# Patient Record
Sex: FEMALE | Race: OTHER | HISPANIC OR LATINO
[De-identification: names, ages, dates, MRNs, and addresses within clinical notes are randomized per-mention and may not be internally consistent; named-entity substitution may affect disease eponyms.]

---

## 2018-11-08 ENCOUNTER — APPOINTMENT (OUTPATIENT)
Dept: INTERNAL MEDICINE | Facility: CLINIC | Age: 28
End: 2018-11-08
Payer: COMMERCIAL

## 2018-11-08 ENCOUNTER — LABORATORY RESULT (OUTPATIENT)
Age: 28
End: 2018-11-08

## 2018-11-08 VITALS
OXYGEN SATURATION: 98 % | BODY MASS INDEX: 27.16 KG/M2 | HEIGHT: 66 IN | HEART RATE: 66 BPM | TEMPERATURE: 98.5 F | WEIGHT: 169 LBS | DIASTOLIC BLOOD PRESSURE: 66 MMHG | SYSTOLIC BLOOD PRESSURE: 102 MMHG

## 2018-11-08 DIAGNOSIS — K58.9 IRRITABLE BOWEL SYNDROME W/OUT DIARRHEA: ICD-10-CM

## 2018-11-08 PROCEDURE — 99385 PREV VISIT NEW AGE 18-39: CPT

## 2018-11-08 NOTE — DISCUSSION/SUMMARY
[Normal Weight (BMI <25)] : normal weight - BMI <25 [Non - Smoker] : non-smoker [FreeTextEntry1] : \par 28 year old woman presents for CPE.\par \par 1. HCM: Vaccines - Tdap up to date; offered Gardasil but pt prefers to read up on this some more before deciding; due for PAP testing - pt prefers female GYN - will recommend Dr. Walker; offered HIV screening but pt declined; counselled pt on safe sex practices, condom use, etc

## 2018-11-08 NOTE — REVIEW OF SYSTEMS
[Fever] : no fever [Chills] : no chills [Abdominal Pain] : abdominal pain [Vomiting] : no vomiting [Constipation] : constipation [Diarrhea] : diarrhea [Heartburn] : heartburn [Melena] : no melena [see HPI] : see HPI [Sleep Disturbances] : sleep disturbances [Anxiety] : anxiety [Emotional Problems] : emotional problems [Negative] : Heme/Lymph

## 2018-11-08 NOTE — HISTORY OF PRESENT ILLNESS
[Health Maintenance] : health maintenance [___ Year(s) Ago] : [unfilled] year(s) ago [Unmarried] : unmarried [Working Full Time] : working full time [Never Smoked Cigarettes] : has never smoked cigarettes [Occasional Use] : occasional alcohol use [None] : The patient has no concerns about alcohol abuse [Never] : has never used illicit drugs [Good] : good [Reg. Dental Visits] : She has regular dental visits [Vision Problems] : She denies vision problems [Hearing Loss] : She denies hearing loss [Healthy Diet] : She consumes a diverse and healthy diet [Weight Concerns] : She does not have any weight concerns [Regular Exercise] : She exercises regularly [Tobacco Use] : She does not use tobacco [Alcohol Use] : She denies alcohol use [Drug Abuse] : She uses illicit drugs [Sexually Active] : she is sexually active [Monogamous (Male Partner)] : is monogamous with a male partner [Reviewed and Updated] : risk screening reviewed and updated

## 2018-11-08 NOTE — PHYSICAL EXAM
[General Appearance - Alert] : alert [General Appearance - In No Acute Distress] : in no acute distress [Sclera] : the sclera and conjunctiva were normal [PERRL With Normal Accommodation] : pupils were equal in size, round, and reactive to light [Extraocular Movements] : extraocular movements were intact [Outer Ear] : the ears and nose were normal in appearance [Oropharynx] : the oropharynx was normal [Neck Appearance] : the appearance of the neck was normal [Neck Cervical Mass (___cm)] : no neck mass was observed [Jugular Venous Distention Increased] : there was no jugular-venous distention [Thyroid Diffuse Enlargement] : the thyroid was not enlarged [Thyroid Nodule] : there were no palpable thyroid nodules [Auscultation Breath Sounds / Voice Sounds] : lungs were clear to auscultation bilaterally [Heart Rate And Rhythm] : heart rate was normal and rhythm regular [Heart Sounds] : normal S1 and S2 [Heart Sounds Gallop] : no gallops [Murmurs] : no murmurs [Heart Sounds Pericardial Friction Rub] : no pericardial rub [Full Pulse] : the pedal pulses are present [Edema] : there was no peripheral edema [FreeTextEntry1] : patient declined, prefers female GYN [Bowel Sounds] : normal bowel sounds [Abdomen Soft] : soft [Abdomen Tenderness] : non-tender [Abdomen Mass (___ Cm)] : no abdominal mass palpated [Cervical Lymph Nodes Enlarged Posterior Bilaterally] : posterior cervical [Cervical Lymph Nodes Enlarged Anterior Bilaterally] : anterior cervical [Supraclavicular Lymph Nodes Enlarged Bilaterally] : supraclavicular [No CVA Tenderness] : no ~M costovertebral angle tenderness [No Spinal Tenderness] : no spinal tenderness [Abnormal Walk] : normal gait [Nail Clubbing] : no clubbing  or cyanosis of the fingernails [Involuntary Movements] : no involuntary movements were seen [Musculoskeletal - Swelling] : no joint swelling seen [Motor Tone] : muscle strength and tone were normal [] : no rash [Sensation] : the sensory exam was normal to light touch and pinprick [Motor Exam] : the motor exam was normal [No Focal Deficits] : no focal deficits [Oriented To Time, Place, And Person] : oriented to person, place, and time [Impaired Insight] : insight and judgment were intact [Affect] : the affect was normal

## 2018-11-09 LAB
ALBUMIN SERPL ELPH-MCNC: 4.7 G/DL
ALP BLD-CCNC: 37 U/L
ALT SERPL-CCNC: 9 U/L
ANION GAP SERPL CALC-SCNC: 14 MMOL/L
AST SERPL-CCNC: 14 U/L
BASOPHILS # BLD AUTO: 0.01 K/UL
BASOPHILS NFR BLD AUTO: 0.2 %
BILIRUB SERPL-MCNC: 0.5 MG/DL
BUN SERPL-MCNC: 9 MG/DL
CALCIUM SERPL-MCNC: 9.2 MG/DL
CHLORIDE SERPL-SCNC: 102 MMOL/L
CHOLEST SERPL-MCNC: 145 MG/DL
CHOLEST/HDLC SERPL: 2.1 RATIO
CO2 SERPL-SCNC: 23 MMOL/L
CREAT SERPL-MCNC: 0.69 MG/DL
EOSINOPHIL # BLD AUTO: 0.11 K/UL
EOSINOPHIL NFR BLD AUTO: 2.3 %
FERRITIN SERPL-MCNC: 15 NG/ML
GLUCOSE SERPL-MCNC: 86 MG/DL
HBA1C MFR BLD HPLC: 4.9 %
HCT VFR BLD CALC: 38.7 %
HDLC SERPL-MCNC: 68 MG/DL
HGB BLD-MCNC: 11.8 G/DL
HIV1+2 AB SPEC QL IA.RAPID: NONREACTIVE
IMM GRANULOCYTES NFR BLD AUTO: 0.2 %
IRON SATN MFR SERPL: 14 %
IRON SERPL-MCNC: 55 UG/DL
LDLC SERPL CALC-MCNC: 68 MG/DL
LYMPHOCYTES # BLD AUTO: 1.76 K/UL
LYMPHOCYTES NFR BLD AUTO: 36.7 %
MAN DIFF?: NORMAL
MCHC RBC-ENTMCNC: 23 PG
MCHC RBC-ENTMCNC: 30.5 GM/DL
MCV RBC AUTO: 75.6 FL
MONOCYTES # BLD AUTO: 0.48 K/UL
MONOCYTES NFR BLD AUTO: 10 %
NEUTROPHILS # BLD AUTO: 2.43 K/UL
NEUTROPHILS NFR BLD AUTO: 50.6 %
PLATELET # BLD AUTO: 213 K/UL
POTASSIUM SERPL-SCNC: 4 MMOL/L
PROT SERPL-MCNC: 7.4 G/DL
RBC # BLD: 5.12 M/UL
RBC # FLD: 22.7 %
SODIUM SERPL-SCNC: 139 MMOL/L
TIBC SERPL-MCNC: 395 UG/DL
TRIGL SERPL-MCNC: 47 MG/DL
TSH SERPL-ACNC: 2.73 UIU/ML
UIBC SERPL-MCNC: 340 UG/DL
WBC # FLD AUTO: 4.8 K/UL

## 2019-03-05 ENCOUNTER — APPOINTMENT (OUTPATIENT)
Dept: INTERNAL MEDICINE | Facility: CLINIC | Age: 29
End: 2019-03-05
Payer: COMMERCIAL

## 2019-03-05 VITALS
SYSTOLIC BLOOD PRESSURE: 142 MMHG | WEIGHT: 169 LBS | HEART RATE: 80 BPM | BODY MASS INDEX: 27.16 KG/M2 | OXYGEN SATURATION: 97 % | TEMPERATURE: 98.4 F | DIASTOLIC BLOOD PRESSURE: 78 MMHG | HEIGHT: 66 IN

## 2019-03-05 LAB
BILIRUB UR QL STRIP: NORMAL
CLARITY UR: CLEAR
COLLECTION METHOD: NORMAL
GLUCOSE UR-MCNC: NORMAL
HCG UR QL: 0.2 EU/DL
HGB UR QL STRIP.AUTO: NORMAL
KETONES UR-MCNC: NORMAL
LEUKOCYTE ESTERASE UR QL STRIP: NORMAL
NITRITE UR QL STRIP: POSITIVE
PH UR STRIP: 7
PROT UR STRIP-MCNC: 30
SP GR UR STRIP: 1.02

## 2019-03-05 PROCEDURE — 99214 OFFICE O/P EST MOD 30 MIN: CPT | Mod: 25

## 2019-03-05 PROCEDURE — 81003 URINALYSIS AUTO W/O SCOPE: CPT | Mod: QW

## 2019-03-05 NOTE — PHYSICAL EXAM
[No Acute Distress] : no acute distress [Well Nourished] : well nourished [Well Developed] : well developed [Well-Appearing] : well-appearing [No Respiratory Distress] : no respiratory distress  [Clear to Auscultation] : lungs were clear to auscultation bilaterally [No Accessory Muscle Use] : no accessory muscle use [Normal Rate] : normal rate  [Regular Rhythm] : with a regular rhythm [Normal S1, S2] : normal S1 and S2 [Soft] : abdomen soft [Non Tender] : non-tender [Non-distended] : non-distended [No HSM] : no HSM [Normal Bowel Sounds] : normal bowel sounds [No CVA Tenderness] : no CVA  tenderness [No Spinal Tenderness] : no spinal tenderness

## 2019-03-05 NOTE — HISTORY OF PRESENT ILLNESS
[FreeTextEntry8] : \par \par CC: Hematuria for the past few days\par \par HPI: Patient is a healthy 28-year-old female with no segment past medical history who presents with several day history of episodic hematuria no flank pain no fever, no chills no dysuria no frequency patient is sexually active with one partner she denies vaginal discharge abdominal pain nausea vomiting constipation diarrhea.  Patient has been on amoxicillin and penicillin for recent dental infection

## 2019-03-05 NOTE — ASSESSMENT
[FreeTextEntry1] : Patient is a healthy 28-year-old female who presents with acute cystitis rule out STD\par \par 1.  Acute cystitis\par Sexually active one partner  has some thick and vaginal discharge\par Urine dipstick positive for moderate leukocytes nitrate positive\par Start Bactrim 1 tablet twice daily first 3 days\par Send urine culture\par Send urine for GC chlamydia\par Observe\par \par

## 2019-03-05 NOTE — REVIEW OF SYSTEMS
[Hematuria] : hematuria [Negative] : Gastrointestinal [Dysuria] : no dysuria [Incontinence] : no incontinence [Nocturia] : no nocturia [Poor Libido] : libido not poor [Frequency] : no frequency [Vaginal Discharge] : no vaginal discharge [Dysmenorrhea] : no dysmenorrhea

## 2019-03-08 LAB
BACTERIA UR CULT: ABNORMAL
C TRACH RRNA SPEC QL NAA+PROBE: NOT DETECTED
N GONORRHOEA RRNA SPEC QL NAA+PROBE: NOT DETECTED
SOURCE AMPLIFICATION: NORMAL

## 2019-03-25 ENCOUNTER — APPOINTMENT (OUTPATIENT)
Dept: DERMATOLOGY | Facility: CLINIC | Age: 29
End: 2019-03-25
Payer: COMMERCIAL

## 2019-03-25 VITALS
DIASTOLIC BLOOD PRESSURE: 60 MMHG | BODY MASS INDEX: 25.11 KG/M2 | HEIGHT: 67 IN | SYSTOLIC BLOOD PRESSURE: 100 MMHG | WEIGHT: 160 LBS

## 2019-03-25 DIAGNOSIS — Z84.0 FAMILY HISTORY OF DISEASES OF THE SKIN AND SUBCUTANEOUS TISSUE: ICD-10-CM

## 2019-03-25 DIAGNOSIS — Z91.89 OTHER SPECIFIED PERSONAL RISK FACTORS, NOT ELSEWHERE CLASSIFIED: ICD-10-CM

## 2019-03-25 PROCEDURE — 99202 OFFICE O/P NEW SF 15 MIN: CPT

## 2019-04-18 ENCOUNTER — OUTPATIENT (OUTPATIENT)
Dept: OUTPATIENT SERVICES | Facility: HOSPITAL | Age: 29
LOS: 1 days | End: 2019-04-18
Payer: COMMERCIAL

## 2019-04-18 DIAGNOSIS — A15.0 TUBERCULOSIS OF LUNG: ICD-10-CM

## 2019-04-18 PROCEDURE — 71046 X-RAY EXAM CHEST 2 VIEWS: CPT | Mod: 26

## 2019-04-18 PROCEDURE — 71046 X-RAY EXAM CHEST 2 VIEWS: CPT

## 2019-10-29 ENCOUNTER — APPOINTMENT (OUTPATIENT)
Dept: INTERNAL MEDICINE | Facility: CLINIC | Age: 29
End: 2019-10-29
Payer: COMMERCIAL

## 2019-10-29 VITALS
HEART RATE: 83 BPM | BODY MASS INDEX: 26.37 KG/M2 | DIASTOLIC BLOOD PRESSURE: 80 MMHG | OXYGEN SATURATION: 98 % | WEIGHT: 168 LBS | HEIGHT: 67 IN | SYSTOLIC BLOOD PRESSURE: 110 MMHG

## 2019-10-29 LAB
HCT VFR BLD CALC: 41.9 %
HGB BLD-MCNC: 13.4 G/DL

## 2019-10-29 PROCEDURE — 99214 OFFICE O/P EST MOD 30 MIN: CPT | Mod: 25

## 2019-10-29 PROCEDURE — 36415 COLL VENOUS BLD VENIPUNCTURE: CPT

## 2019-10-29 NOTE — HISTORY OF PRESENT ILLNESS
[FreeTextEntry8] : CC: bright red blood per rectum for the past two days\par \par HPI: patient is a healthy 29-year-old female who presents with two day history of bright red blood per rectum on toilet paper denies lumps and bumps abdominal pain nausea vomiting constipation or diarrhea patient notes slight burning when passing stool. Denies family history of colitis, cancer: or bowel problems.

## 2019-10-29 NOTE — PHYSICAL EXAM
[Normal] : no joint swelling and grossly normal strength and tone [FreeTextEntry1] : Rectal even with escort

## 2019-10-29 NOTE — ASSESSMENT
[FreeTextEntry1] : Patient is a healthy 29-year-old female who presents with two day history of bright red per rectum\par \par 1. Bright red blood per rectum\par patient refused physical exam will refer to female colorectal surgeon to follow-up\par avoid constipation with Colace/fiber\par avoid spicy foods\par check CBC\par follow-up with PMD

## 2019-11-12 ENCOUNTER — APPOINTMENT (OUTPATIENT)
Dept: DERMATOLOGY | Facility: CLINIC | Age: 29
End: 2019-11-12
Payer: COMMERCIAL

## 2019-11-12 DIAGNOSIS — L30.0 NUMMULAR DERMATITIS: ICD-10-CM

## 2019-11-12 DIAGNOSIS — L30.1 DYSHIDROSIS [POMPHOLYX]: ICD-10-CM

## 2019-11-12 PROCEDURE — 99213 OFFICE O/P EST LOW 20 MIN: CPT

## 2019-12-11 ENCOUNTER — APPOINTMENT (OUTPATIENT)
Dept: OBGYN | Facility: CLINIC | Age: 29
End: 2019-12-11
Payer: COMMERCIAL

## 2019-12-11 VITALS
WEIGHT: 179 LBS | DIASTOLIC BLOOD PRESSURE: 70 MMHG | HEIGHT: 67 IN | BODY MASS INDEX: 28.09 KG/M2 | SYSTOLIC BLOOD PRESSURE: 114 MMHG

## 2019-12-11 PROCEDURE — 99385 PREV VISIT NEW AGE 18-39: CPT

## 2019-12-11 RX ORDER — SULFAMETHOXAZOLE AND TRIMETHOPRIM 800; 160 MG/1; MG/1
800-160 TABLET ORAL
Qty: 6 | Refills: 0 | Status: DISCONTINUED | COMMUNITY
Start: 2019-03-05 | End: 2019-12-11

## 2019-12-11 RX ORDER — HYDROCORTISONE ACETATE 30 MG/1
30 SUPPOSITORY RECTAL
Qty: 7 | Refills: 0 | Status: DISCONTINUED | COMMUNITY
Start: 2019-10-29 | End: 2019-12-11

## 2019-12-11 NOTE — HISTORY OF PRESENT ILLNESS
[Last Pap ___] : Last cervical pap smear was [unfilled] [HPV Vaccine ___] : HPV vaccine [unfilled] [Frequency: Q ___ days] : menstrual periods occur approximately every [unfilled] days [Menstrual Cramps] : menstrual cramps [Monogamous] : is monogamous [Sexually Active] : is sexually active [Contraception] : uses contraception [Condoms] : uses condoms [Male ___] : [unfilled] male [Regular Cycle Intervals] : periods have been irregular

## 2019-12-11 NOTE — PHYSICAL EXAM
[Awake] : awake [Alert] : alert [Acute Distress] : no acute distress [LAD] : no lymphadenopathy [Thyroid Nodule] : no thyroid nodule [Goiter] : no goiter [Mass] : no breast mass [Nipple Discharge] : no nipple discharge [Axillary LAD] : no axillary lymphadenopathy [Tender] : non tender [Soft] : soft [Distended] : not distended [H/Smegaly] : no hepatosplenomegaly [Oriented x3] : oriented to person, place, and time [Depressed Mood] : not depressed [Flat Affect] : affect not flat [Normal] : cervix [Uterine Adnexae] : were not tender and not enlarged [No Bleeding] : there was no active vaginal bleeding

## 2019-12-12 LAB
HIV1+2 AB SPEC QL IA.RAPID: NONREACTIVE
T PALLIDUM AB SER QL IA: NEGATIVE

## 2019-12-16 LAB — CYTOLOGY CVX/VAG DOC THIN PREP: NORMAL

## 2020-01-06 ENCOUNTER — OTHER (OUTPATIENT)
Age: 30
End: 2020-01-06

## 2020-01-08 ENCOUNTER — APPOINTMENT (OUTPATIENT)
Dept: INTERNAL MEDICINE | Facility: CLINIC | Age: 30
End: 2020-01-08
Payer: COMMERCIAL

## 2020-01-08 VITALS
HEART RATE: 71 BPM | TEMPERATURE: 99.1 F | OXYGEN SATURATION: 98 % | SYSTOLIC BLOOD PRESSURE: 104 MMHG | WEIGHT: 183 LBS | BODY MASS INDEX: 28.72 KG/M2 | DIASTOLIC BLOOD PRESSURE: 60 MMHG | HEIGHT: 67 IN

## 2020-01-08 DIAGNOSIS — L30.9 DERMATITIS, UNSPECIFIED: ICD-10-CM

## 2020-01-08 DIAGNOSIS — K62.5 HEMORRHAGE OF ANUS AND RECTUM: ICD-10-CM

## 2020-01-08 PROCEDURE — 99395 PREV VISIT EST AGE 18-39: CPT

## 2020-01-08 PROCEDURE — G0444 DEPRESSION SCREEN ANNUAL: CPT | Mod: NC,59

## 2020-01-10 PROBLEM — K62.5 BRBPR (BRIGHT RED BLOOD PER RECTUM): Status: RESOLVED | Noted: 2019-10-29 | Resolved: 2020-01-10

## 2020-01-10 PROBLEM — L30.9 ECZEMA: Status: ACTIVE | Noted: 2019-03-05

## 2020-01-10 LAB
ALBUMIN SERPL ELPH-MCNC: 4.7 G/DL
ALP BLD-CCNC: 39 U/L
ALT SERPL-CCNC: 16 U/L
ANION GAP SERPL CALC-SCNC: 11 MMOL/L
AST SERPL-CCNC: 15 U/L
BASOPHILS # BLD AUTO: 0.03 K/UL
BASOPHILS NFR BLD AUTO: 0.4 %
BILIRUB SERPL-MCNC: 0.3 MG/DL
BUN SERPL-MCNC: 11 MG/DL
CALCIUM SERPL-MCNC: 9.2 MG/DL
CHLORIDE SERPL-SCNC: 102 MMOL/L
CHOLEST SERPL-MCNC: 162 MG/DL
CHOLEST/HDLC SERPL: 2.8 RATIO
CO2 SERPL-SCNC: 25 MMOL/L
CREAT SERPL-MCNC: 0.81 MG/DL
EOSINOPHIL # BLD AUTO: 0.21 K/UL
EOSINOPHIL NFR BLD AUTO: 3.1 %
ESTIMATED AVERAGE GLUCOSE: 108 MG/DL
GLUCOSE SERPL-MCNC: 89 MG/DL
HBA1C MFR BLD HPLC: 5.4 %
HCT VFR BLD CALC: 40.1 %
HDLC SERPL-MCNC: 58 MG/DL
HGB BLD-MCNC: 12.5 G/DL
IMM GRANULOCYTES NFR BLD AUTO: 0.6 %
LDLC SERPL CALC-MCNC: 86 MG/DL
LYMPHOCYTES # BLD AUTO: 2.16 K/UL
LYMPHOCYTES NFR BLD AUTO: 31.7 %
MAN DIFF?: NORMAL
MCHC RBC-ENTMCNC: 27.8 PG
MCHC RBC-ENTMCNC: 31.2 GM/DL
MCV RBC AUTO: 89.3 FL
MONOCYTES # BLD AUTO: 0.71 K/UL
MONOCYTES NFR BLD AUTO: 10.4 %
NEUTROPHILS # BLD AUTO: 3.67 K/UL
NEUTROPHILS NFR BLD AUTO: 53.8 %
PLATELET # BLD AUTO: 182 K/UL
POTASSIUM SERPL-SCNC: 4.1 MMOL/L
PROT SERPL-MCNC: 7.2 G/DL
RBC # BLD: 4.49 M/UL
RBC # FLD: 13.4 %
SODIUM SERPL-SCNC: 138 MMOL/L
TRIGL SERPL-MCNC: 92 MG/DL
TSH SERPL-ACNC: 2.21 UIU/ML
WBC # FLD AUTO: 6.82 K/UL

## 2020-01-10 NOTE — DISCUSSION/SUMMARY
[Normal Weight (BMI <25)] : normal weight - BMI <25 [Non - Smoker] : non-smoker [FreeTextEntry1] : \par 29 year old woman presents for CPE.\par \par 1. HCM: Vaccines - Tdap up to date; offered Gardasil but pt prefers to read up on this some more before deciding; due for PAP testing - pt prefers female GYN - will recommend Dr. Walker; offered HIV screening but pt declined; counselled pt on safe sex practices, condom use, etc\par \par Depression screen performed today, PHQ2=0

## 2020-01-10 NOTE — REVIEW OF SYSTEMS
[Abdominal Pain] : abdominal pain [Constipation] : constipation [Diarrhea] : diarrhea [Heartburn] : heartburn [see HPI] : see HPI [Sleep Disturbances] : sleep disturbances [Anxiety] : anxiety [Emotional Problems] : emotional problems [Negative] : Heme/Lymph [Fever] : no fever [Chills] : no chills [Vomiting] : no vomiting [Melena] : no melena

## 2020-01-10 NOTE — HISTORY OF PRESENT ILLNESS
[___ Year(s) Ago] : [unfilled] year(s) ago [Health Maintenance] : health maintenance [Never Smoked Cigarettes] : has never smoked cigarettes [Unmarried] : unmarried [Working Full Time] : working full time [Occasional Use] : occasional alcohol use [Never] : has never used illicit drugs [None] : The patient has no concerns about alcohol abuse [Reg. Dental Visits] : She has regular dental visits [Good] : good [Healthy Diet] : She consumes a diverse and healthy diet [Regular Exercise] : She exercises regularly [Sexually Active] : she is sexually active [Drug Abuse] : She uses illicit drugs [Monogamous (Male Partner)] : is monogamous with a male partner [Reviewed and Updated] : risk screening reviewed and updated [Vision Problems] : She denies vision problems [Weight Concerns] : She does not have any weight concerns [Hearing Loss] : She denies hearing loss [Tobacco Use] : She does not use tobacco [Alcohol Use] : She denies alcohol use

## 2020-01-10 NOTE — PHYSICAL EXAM
[General Appearance - Alert] : alert [General Appearance - In No Acute Distress] : in no acute distress [Sclera] : the sclera and conjunctiva were normal [PERRL With Normal Accommodation] : pupils were equal in size, round, and reactive to light [Extraocular Movements] : extraocular movements were intact [Neck Appearance] : the appearance of the neck was normal [Outer Ear] : the ears and nose were normal in appearance [Oropharynx] : the oropharynx was normal [Thyroid Diffuse Enlargement] : the thyroid was not enlarged [Jugular Venous Distention Increased] : there was no jugular-venous distention [Neck Cervical Mass (___cm)] : no neck mass was observed [Thyroid Nodule] : there were no palpable thyroid nodules [Auscultation Breath Sounds / Voice Sounds] : lungs were clear to auscultation bilaterally [Heart Rate And Rhythm] : heart rate was normal and rhythm regular [Heart Sounds Gallop] : no gallops [Murmurs] : no murmurs [Heart Sounds] : normal S1 and S2 [Heart Sounds Pericardial Friction Rub] : no pericardial rub [Full Pulse] : the pedal pulses are present [Bowel Sounds] : normal bowel sounds [Edema] : there was no peripheral edema [Abdomen Soft] : soft [Abdomen Tenderness] : non-tender [Abdomen Mass (___ Cm)] : no abdominal mass palpated [Cervical Lymph Nodes Enlarged Posterior Bilaterally] : posterior cervical [No CVA Tenderness] : no ~M costovertebral angle tenderness [Supraclavicular Lymph Nodes Enlarged Bilaterally] : supraclavicular [Cervical Lymph Nodes Enlarged Anterior Bilaterally] : anterior cervical [No Spinal Tenderness] : no spinal tenderness [Nail Clubbing] : no clubbing  or cyanosis of the fingernails [Abnormal Walk] : normal gait [Involuntary Movements] : no involuntary movements were seen [Musculoskeletal - Swelling] : no joint swelling seen [Motor Tone] : muscle strength and tone were normal [] : no rash [Sensation] : the sensory exam was normal to light touch and pinprick [Motor Exam] : the motor exam was normal [No Focal Deficits] : no focal deficits [Oriented To Time, Place, And Person] : oriented to person, place, and time [Impaired Insight] : insight and judgment were intact [Affect] : the affect was normal [Breast Palpation Mass] : no palpable masses [Breast Appearance] : normal in appearance [Breast Abnormal Lactation (Galactorrhea)] : no nipple discharge

## 2020-04-21 ENCOUNTER — APPOINTMENT (OUTPATIENT)
Dept: INTERNAL MEDICINE | Facility: CLINIC | Age: 30
End: 2020-04-21

## 2020-04-26 ENCOUNTER — MESSAGE (OUTPATIENT)
Age: 30
End: 2020-04-26

## 2020-06-23 ENCOUNTER — RX RENEWAL (OUTPATIENT)
Age: 30
End: 2020-06-23

## 2020-06-24 RX ORDER — NITROFURANTOIN (MONOHYDRATE/MACROCRYSTALS) 25; 75 MG/1; MG/1
100 CAPSULE ORAL
Qty: 10 | Refills: 0 | Status: COMPLETED | COMMUNITY
Start: 2020-06-24 | End: 2020-06-29

## 2020-07-02 ENCOUNTER — TRANSCRIPTION ENCOUNTER (OUTPATIENT)
Age: 30
End: 2020-07-02

## 2020-08-05 ENCOUNTER — APPOINTMENT (OUTPATIENT)
Dept: UROGYNECOLOGY | Facility: CLINIC | Age: 30
End: 2020-08-05
Payer: COMMERCIAL

## 2020-08-05 VITALS
BODY MASS INDEX: 26.68 KG/M2 | SYSTOLIC BLOOD PRESSURE: 106 MMHG | HEIGHT: 67 IN | TEMPERATURE: 96.7 F | DIASTOLIC BLOOD PRESSURE: 64 MMHG | WEIGHT: 170 LBS

## 2020-08-05 LAB
BILIRUB UR QL STRIP: NORMAL
CLARITY UR: CLEAR
COLLECTION METHOD: NORMAL
GLUCOSE UR-MCNC: NORMAL
HCG UR QL: 0.2 EU/DL
HGB UR QL STRIP.AUTO: NORMAL
KETONES UR-MCNC: NORMAL
LEUKOCYTE ESTERASE UR QL STRIP: NORMAL
NITRITE UR QL STRIP: NORMAL
PH UR STRIP: 8.5
PROT UR STRIP-MCNC: NORMAL
SP GR UR STRIP: 1.02

## 2020-08-05 PROCEDURE — 99205 OFFICE O/P NEW HI 60 MIN: CPT | Mod: 25

## 2020-08-05 PROCEDURE — 51701 INSERT BLADDER CATHETER: CPT

## 2020-08-05 NOTE — PROCEDURE
[Bowel Management] : bowel management [Fluid Management] : fluid management [Bladder Training] : bladder training [FreeTextEntry1] : Sterile straight catheterization was performed to measure a postvoid residual volume which was 30 cc

## 2020-08-05 NOTE — PHYSICAL EXAM
[Chaperone Present] : A chaperone was present in the examining room during all aspects of the physical examination [Labia Minora] : were normal [Labia Majora] : were normal [Normal Appearance] : general appearance was normal [Normal] : no abnormalities [Exam Deferred] : was deferred [FreeTextEntry1] : General: Well, appearing. Alert and orientated. No acute distress\par HEENT: Normocephalic, atraumatic and extraocular muscles appear to be intact \par Neck: Full range of motion, no obvious lymphadenopathy, deformities, or masses noted \par Respiratory: Speaking in full sentences comfortably, normal work of breathing and no cough during visit\par Musculoskeletal: active full range of motion in extremities \par Extremities: No upper extremity edema noted\par Skin: no obvious rash or skin lesions\par Neuro: Orientated X 3, speech is fluent, normal rate\par Psych: Normal mood and affect \par   [Distended] : not distended [H/Smegaly] : no hepatosplenomegaly [Tenderness] : ~T no ~M abdominal tenderness observed

## 2020-08-05 NOTE — REASON FOR VISIT
[Questionnaire Received] : Patient questionnaire received [Intake Form Reviewed] : Patient intake form with past medical history, surgical history, family history and social history reviewed today [Urinary Urgency] : urinary urgency [Urine Frequency] : urine frequency

## 2020-08-05 NOTE — DISCUSSION/SUMMARY
"Adult Mental Health Outpatient Group Therapy Progress Note     Date: 12/06/17  Time: 10:00-10:50    Client Initial Individualized Goals for Treatment:\"Positive structure.  Concerned about backsliding.  I want to maintain where I am now because I am doing much better.  Continue to move forward.  Work towards finding a part time job- figure out what makes sense as first steps and what type of job to pursue first.\".      Treatment Goals from Individualized Treatment Plan:   1) Safety: Client will notify staff when needing assistance to develop or implement a coping plan to manage suicidal or self injurious urges.  Client will use coping plan for safety, as needed.  2) Symptom Management: Bernadine will process life stressors and identify ways to work through and problem solve current issues as they come up.  3) Life Skills: In life skills Bernadine will learn, explore, practice and apply 2-3 skills/strategies that promote mindfulness and help her establish routine and life balance specifically in area of finding employment. Report on progress weekly.   4) Wellness/D.c planning: Will improve wellness related behaviors by setting wellness goals weekly. Reporting on progress weekly      Area of Treatment Focus:  Symptom Management, Personal Safety, Community Resources/Discharge Planning, Develop / Improve Independent Living Skills and Develop Socialization / Interpersonal Relationship Skills    Therapeutic Interventions/Treatment Strategies:  Support, Redirection, Feedback, Limit/Boundaries, Safety Assessments, Structured Activity, Problem Solving, Clarification and Education    Response to Treatment Strategies:  Gave Feedback, Listened, Attentive and Alert    Name of Group:  Self support skills    Description and Outcome: Bernadine attended and participated in a structured self-support skills group where intervention focuses on learning, developing, and practicing coping and life skills and strategies through structured experiential " [FreeTextEntry1] : \par We discussed possible etiologies of her symptoms including overactive bladder and IC/BPS. At this time, I believe her symptoms are more consistent with overactive bladder. I recommend she start behavioral modifications and bladder training. Written instructions on how to perform bladder training were provided.  She will try this for 4-6 weeks. We reviewed medications for overactive bladder.  If she has no significant improvement in her symptoms she will try adding an anticholinergic medication. Solifenacin 5 mg Rx provided.  Risks and side effects reviewed extensively. She will RTO in 10-12 weeks for follow up or sooner if issues arise. If she has no improvement in symptoms, will  consider further workup including urodynamic testing and/or cystoscopy. We reviewed a bowel regimen as well. She will try Align plus benefiber (1/2 to full dose).\par \par The following was provided to her in written form and reviewed extensively. Patient was given a copy to take home: \par For Urinary Symptoms:\par **Total fluids: 34-50 oz (1-1.5 Liters) per day\par   -Ex. 8 oz coffee or tea (NOT BOTH!), 2-3 bottles of water (Each bottle is 16.9 oz). No flavoring added. No seltzer or Pelligrino!\par  -Drink slowly throughout day, no binge drinking (each bottle of water should take you hours, not minutes)\par **Avoid: coffee, tea, alcohol, carbonation (soda, seltzer), spicy foods, citrus, artificial sweeteners, chocolate\par **Stop eating and drinking 2-3 hours before bedtime (sips ok)\par \par -Try the above fluid changes and bladder training for 4 weeks. If symptoms still remain bothersome, add Vesicare (solifenacin) 5 mg daily. You must continue the fluid changes while on medication. Medication may take 3-4 weeks to start working. Common side effects include: dry mouth, dry eyes, constipation. If side effects try to manage first:\par Dry eyes: lubricating eye drops\par Dry mouth: biotene mouth wash\par Constipation: Miralax\par \par If not covered by insurance, call insurance company and get list of overactive bladder medications that are covered. Call my office with list (Mon-Fri) and we will change medication\par \par   psycho-education to improve function in valued roles, routines, relationships, and independent living skills.     Today the focus of the session is on developing self-regulation skills through structured activity. Group members were provided guidance and assistance in a chosen focused activity to gain self-awareness around what they find calming, grounding, or cognitively mindful. Bernadine spends time on focusing on a research article on resilience. She asks questions and brings up topics to discuss with group members. She demonstrated understanding of session content and purpose by participating in all aspects of the structured activity and verbalizing she feels validated empowered from reading provided articles.     Treatment Planning Meetings:  12/06/17: Met with team, progressing. Set discharge date for January 9th.  11/03/17: Team met with renae Colindres, See Treatment plan for details.  10/03/17: Team met with Bernadine, discussed progress. See Treatment Plan for details.   9/01/17: Team members met with Bernadine, discussed program, process, progress and set treatment goals with her. See Individualized treatment goals for details.   : Absent.  8/07/17: first day in program.    Is this a Weekly Review of the Progress on the Treatment Plan?  No..

## 2020-08-05 NOTE — HISTORY OF PRESENT ILLNESS
[Unable To Restrain Bowel Movement] : mild [Feelings Of Urinary Urgency] : severe [Hematuria] : severe [Pain During Urination (Dysuria)] : no [x2] : nocturia two times a night [Urinary Tract Infection] : severe [Constipation Obstructed Defecation] : mild [Pelvic Pain] : mild [] : years ago [de-identified] : with jumping, feels she "cant hold urine" [FreeTextEntry6] : h/o IBS constipation [de-identified] : pressure in bladder area [FreeTextEntry1] : \par PMH: IBS\par PSH: none\par Social History: single, employed as pharmacist tech at [employer], nonsmoker\par \par daily fluid intake: 1 gallon of water, 12 oz diet soda or seltzer, 28 oz coffee

## 2020-08-06 ENCOUNTER — APPOINTMENT (OUTPATIENT)
Dept: INTERNAL MEDICINE | Facility: CLINIC | Age: 30
End: 2020-08-06

## 2020-08-07 LAB
APPEARANCE: CLEAR
BACTERIA UR CULT: NORMAL
BACTERIA: NEGATIVE
BILIRUBIN URINE: NEGATIVE
BLOOD URINE: NEGATIVE
COLOR: YELLOW
GLUCOSE QUALITATIVE U: NEGATIVE
HYALINE CASTS: 4 /LPF
KETONES URINE: NEGATIVE
LEUKOCYTE ESTERASE URINE: NEGATIVE
MICROSCOPIC-UA: NORMAL
NITRITE URINE: NEGATIVE
PH URINE: 8
PROTEIN URINE: NORMAL
RED BLOOD CELLS URINE: 3 /HPF
SPECIFIC GRAVITY URINE: 1.02
SQUAMOUS EPITHELIAL CELLS: 1 /HPF
UROBILINOGEN URINE: NORMAL
WHITE BLOOD CELLS URINE: 0 /HPF

## 2020-11-18 ENCOUNTER — APPOINTMENT (OUTPATIENT)
Dept: UROGYNECOLOGY | Facility: CLINIC | Age: 30
End: 2020-11-18

## 2020-11-20 ENCOUNTER — NON-APPOINTMENT (OUTPATIENT)
Age: 30
End: 2020-11-20

## 2020-12-16 ENCOUNTER — APPOINTMENT (OUTPATIENT)
Dept: OBGYN | Facility: CLINIC | Age: 30
End: 2020-12-16

## 2020-12-23 ENCOUNTER — LABORATORY RESULT (OUTPATIENT)
Age: 30
End: 2020-12-23

## 2020-12-23 ENCOUNTER — NON-APPOINTMENT (OUTPATIENT)
Age: 30
End: 2020-12-23

## 2020-12-23 ENCOUNTER — APPOINTMENT (OUTPATIENT)
Dept: INTERNAL MEDICINE | Facility: CLINIC | Age: 30
End: 2020-12-23
Payer: COMMERCIAL

## 2020-12-23 VITALS
WEIGHT: 170 LBS | DIASTOLIC BLOOD PRESSURE: 70 MMHG | HEART RATE: 72 BPM | SYSTOLIC BLOOD PRESSURE: 110 MMHG | OXYGEN SATURATION: 98 % | BODY MASS INDEX: 26.68 KG/M2 | TEMPERATURE: 98 F | HEIGHT: 67 IN

## 2020-12-23 DIAGNOSIS — R42 DIZZINESS AND GIDDINESS: ICD-10-CM

## 2020-12-23 DIAGNOSIS — Z87.440 PERSONAL HISTORY OF URINARY (TRACT) INFECTIONS: ICD-10-CM

## 2020-12-23 DIAGNOSIS — R10.30 LOWER ABDOMINAL PAIN, UNSPECIFIED: ICD-10-CM

## 2020-12-23 DIAGNOSIS — N39.3 STRESS INCONTINENCE (FEMALE) (MALE): ICD-10-CM

## 2020-12-23 LAB
BILIRUB UR QL STRIP: NEGATIVE
COLLECTION METHOD: NORMAL
GLUCOSE UR-MCNC: NEGATIVE
HCG UR QL: 0.2 EU/DL
HGB UR QL STRIP.AUTO: NORMAL
KETONES UR-MCNC: NEGATIVE
LEUKOCYTE ESTERASE UR QL STRIP: NORMAL
NITRITE UR QL STRIP: NEGATIVE
PH UR STRIP: 5.5
PROT UR STRIP-MCNC: NEGATIVE
SP GR UR STRIP: 1.01

## 2020-12-23 PROCEDURE — 36415 COLL VENOUS BLD VENIPUNCTURE: CPT

## 2020-12-23 PROCEDURE — 81025 URINE PREGNANCY TEST: CPT

## 2020-12-23 PROCEDURE — 99214 OFFICE O/P EST MOD 30 MIN: CPT | Mod: 25

## 2020-12-23 PROCEDURE — 99072 ADDL SUPL MATRL&STAF TM PHE: CPT

## 2020-12-23 RX ORDER — SOLIFENACIN SUCCINATE 5 MG/1
5 TABLET ORAL
Qty: 30 | Refills: 5 | Status: DISCONTINUED | COMMUNITY
Start: 2020-08-05 | End: 2020-12-23

## 2020-12-24 PROBLEM — R10.30 LOWER ABDOMINAL PAIN: Status: ACTIVE | Noted: 2020-12-23

## 2020-12-24 PROBLEM — R42 DIZZINESS, NONSPECIFIC: Status: ACTIVE | Noted: 2020-12-23

## 2020-12-24 LAB — HCG UR QL: NEGATIVE

## 2020-12-24 NOTE — REVIEW OF SYSTEMS
[Fatigue] : fatigue [Recent Change In Weight] : ~T recent weight change [Abdominal Pain] : abdominal pain [Constipation] : constipation [Diarrhea] : diarrhea [Headache] : headache [Dizziness] : dizziness [Dysuria] : no dysuria [Incontinence] : no incontinence [FreeTextEntry2] : sleep is variable; weight loss intentional [FreeTextEntry3] : no blurry vision [FreeTextEntry7] : has usual IBS symptoms, abdominal pain and bloating [FreeTextEntry8] : see HPI; has had blood in urine in the past [de-identified] : no vertigo [FreeTextEntry1] : GYNE - gets intermittent lower abdominal pain, mostly left side, sometimes right, does not take anything for it, told by gyne it might be hormonal; menses regular to long (28 - 40 days), normal flow, not heavy

## 2020-12-24 NOTE — PHYSICAL EXAM
[No Acute Distress] : no acute distress [Well Nourished] : well nourished [Well-Appearing] : well-appearing [Normal Sclera/Conjunctiva] : normal sclera/conjunctiva [EOMI] : extraocular movements intact [Normal Outer Ear/Nose] : the outer ears and nose were normal in appearance [Normal Oropharynx] : the oropharynx was normal [Normal TMs] : both tympanic membranes were normal [No Lymphadenopathy] : no lymphadenopathy [Thyroid Normal, No Nodules] : the thyroid was normal and there were no nodules present [No Respiratory Distress] : no respiratory distress  [Clear to Auscultation] : lungs were clear to auscultation bilaterally [Normal Rate] : normal rate  [Regular Rhythm] : with a regular rhythm [Normal S1, S2] : normal S1 and S2 [No Murmur] : no murmur heard [No Extremity Clubbing/Cyanosis] : no extremity clubbing/cyanosis [Soft] : abdomen soft [Non Tender] : non-tender [Non-distended] : non-distended [No Masses] : no abdominal mass palpated [Normal Bowel Sounds] : normal bowel sounds [Grossly Normal Strength/Tone] : grossly normal strength/tone [No Focal Deficits] : no focal deficits [Normal Gait] : normal gait [Deep Tendon Reflexes (DTR)] : deep tendon reflexes were 2+ and symmetric [Normal Affect] : the affect was normal [Normal Mood] : the mood was normal [de-identified] : no nystagmus [de-identified] : normal tandem gait, (-)Rhomberg; no tremor

## 2020-12-24 NOTE — HISTORY OF PRESENT ILLNESS
[FreeTextEntry1] : Dizziness [de-identified] : Pt reports about 3 weeks of dizziness, feels it in her head, feels a little off-balance, weak, no vertigo. Just overall does not feel right, vague, difficult to explain. Also notes feeling thirsty, but doesn’t drink a lot because of her urinary issues. Sees urogyne, has OAB, no incontinence, and stopped taking the medication. 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

## 2021-01-27 ENCOUNTER — APPOINTMENT (OUTPATIENT)
Dept: OBGYN | Facility: CLINIC | Age: 31
End: 2021-01-27
Payer: COMMERCIAL

## 2021-01-27 LAB
BILIRUB UR QL STRIP: NORMAL
GLUCOSE UR-MCNC: NORMAL
HCG UR QL: 0.2 EU/DL
HGB UR QL STRIP.AUTO: NORMAL
KETONES UR-MCNC: NORMAL
LEUKOCYTE ESTERASE UR QL STRIP: NORMAL
NITRITE UR QL STRIP: NORMAL
PH UR STRIP: 5.5
PROT UR STRIP-MCNC: NORMAL
SP GR UR STRIP: 1.01

## 2021-01-27 PROCEDURE — 81002 URINALYSIS NONAUTO W/O SCOPE: CPT

## 2021-01-27 PROCEDURE — 99395 PREV VISIT EST AGE 18-39: CPT

## 2021-01-27 PROCEDURE — 99072 ADDL SUPL MATRL&STAF TM PHE: CPT

## 2021-01-27 RX ORDER — HALOBETASOL PROPIONATE 0.5 MG/G
0.05 OINTMENT TOPICAL
Qty: 1 | Refills: 2 | Status: DISCONTINUED | COMMUNITY
Start: 2019-11-12 | End: 2021-01-27

## 2021-01-27 RX ORDER — TRIAMCINOLONE ACETONIDE 1 MG/G
0.1 OINTMENT TOPICAL
Qty: 1 | Refills: 1 | Status: DISCONTINUED | COMMUNITY
Start: 2019-03-25 | End: 2021-01-27

## 2021-01-27 RX ORDER — AMOXICILLIN 500 MG/1
500 TABLET, FILM COATED ORAL
Qty: 6 | Refills: 0 | Status: DISCONTINUED | COMMUNITY
Start: 2020-12-28 | End: 2021-01-27

## 2021-01-28 VITALS
TEMPERATURE: 97.1 F | DIASTOLIC BLOOD PRESSURE: 67 MMHG | WEIGHT: 187.13 LBS | BODY MASS INDEX: 29.37 KG/M2 | HEIGHT: 67 IN | SYSTOLIC BLOOD PRESSURE: 101 MMHG

## 2021-01-28 VITALS
HEIGHT: 67 IN | WEIGHT: 187.13 LBS | DIASTOLIC BLOOD PRESSURE: 67 MMHG | SYSTOLIC BLOOD PRESSURE: 101 MMHG | BODY MASS INDEX: 29.37 KG/M2 | TEMPERATURE: 97.1 F

## 2021-01-28 NOTE — HISTORY OF PRESENT ILLNESS
[Regular Cycle Intervals] : periods have been regular [Currently Active] : currently active [No] : No [Condoms] : Condoms [PapSmeardate] : 12/19

## 2021-01-29 LAB
BACTERIA UR CULT: NORMAL
C TRACH RRNA SPEC QL NAA+PROBE: NOT DETECTED
N GONORRHOEA RRNA SPEC QL NAA+PROBE: NOT DETECTED
SOURCE AMPLIFICATION: NORMAL

## 2021-02-04 ENCOUNTER — NON-APPOINTMENT (OUTPATIENT)
Age: 31
End: 2021-02-04

## 2021-02-08 ENCOUNTER — APPOINTMENT (OUTPATIENT)
Dept: UROGYNECOLOGY | Facility: CLINIC | Age: 31
End: 2021-02-08
Payer: COMMERCIAL

## 2021-02-08 ENCOUNTER — RESULT CHARGE (OUTPATIENT)
Age: 31
End: 2021-02-08

## 2021-02-08 LAB
BILIRUB UR QL STRIP: NORMAL
CLARITY UR: CLEAR
COLLECTION METHOD: NORMAL
GLUCOSE UR-MCNC: NORMAL
HCG UR QL: 0.2 EU/DL
HGB UR QL STRIP.AUTO: NORMAL
KETONES UR-MCNC: NORMAL
LEUKOCYTE ESTERASE UR QL STRIP: NORMAL
NITRITE UR QL STRIP: NORMAL
PH UR STRIP: 6
PROT UR STRIP-MCNC: NORMAL
SP GR UR STRIP: 1.03

## 2021-02-08 PROCEDURE — 81003 URINALYSIS AUTO W/O SCOPE: CPT | Mod: QW

## 2021-02-08 PROCEDURE — 99072 ADDL SUPL MATRL&STAF TM PHE: CPT

## 2021-02-08 PROCEDURE — 99213 OFFICE O/P EST LOW 20 MIN: CPT

## 2021-02-08 NOTE — DISCUSSION/SUMMARY
[FreeTextEntry1] : 1. Microscopic hematuria\par - POCT urine dip positive for trace-intact blood; Formal UA/CS sent to lab\par - Patient educated that will wait for formal UA/CS results and at that time evaluate need for further work-up\par - Patient agrees to call office with any questions or concerns, verbalized understanding.

## 2021-02-08 NOTE — PHYSICAL EXAM
[No Acute Distress] : in no acute distress [Well developed] : well developed [Well Nourished] : ~L well nourished [Good Hygeine] : demonstrates good hygeine [Oriented x3] : oriented to person, place, and time [Normal Memory] : ~T memory was ~L unimpaired [Normal Mood/Affect] : mood and affect are normal [Warm and Dry] : was warm and dry to touch [Normal Gait] : gait was normal [Labia Majora] : were normal [Labia Minora] : were normal [Normal] : was normal [Anxiety] : patient is not anxious [Tenderness] : ~T no ~M abdominal tenderness observed [Distended] : not distended

## 2021-02-08 NOTE — HISTORY OF PRESENT ILLNESS
[FreeTextEntry1] : Patient presents to office today for repeat Urinalysis and urine culture.  Patient was found to have blood in urine on 08/05/2020 and needed to have it repeated.  Patient denies hematuria, urinary frequency, urgency or dysuria.  Denies pelvic pain pressure or vaginal bleeding.

## 2021-02-08 NOTE — PROCEDURE
[Other ___] : [unfilled] [Straight Catheterization] : insertion of a straight catheter [Patient] : the patient [___ Fr Straight Tip] : a [unfilled] in [place] straight tip catheter [Clear] : clear [Culture] : culture [Urinalysis] : urinalysis [No Complications] : no complications [Tolerated Well] : the patient tolerated the procedure well [0] : 0 [de-identified] : Bladder emptied 50ML [FreeTextEntry1] : urethra cleared, catheter passed, NO CVAT

## 2021-02-10 LAB — BACTERIA UR CULT: NORMAL

## 2021-02-16 ENCOUNTER — NON-APPOINTMENT (OUTPATIENT)
Age: 31
End: 2021-02-16

## 2021-02-16 LAB
APPEARANCE: CLEAR
BACTERIA: NEGATIVE
BILIRUBIN URINE: NEGATIVE
BLOOD URINE: NEGATIVE
COLOR: YELLOW
GLUCOSE QUALITATIVE U: NEGATIVE
HYALINE CASTS: 1 /LPF
KETONES URINE: NEGATIVE
LEUKOCYTE ESTERASE URINE: NEGATIVE
MICROSCOPIC-UA: NORMAL
NITRITE URINE: NEGATIVE
PH URINE: 6
PROTEIN URINE: NORMAL
RED BLOOD CELLS URINE: 5 /HPF
SPECIFIC GRAVITY URINE: 1.03
SQUAMOUS EPITHELIAL CELLS: 1 /HPF
UROBILINOGEN URINE: NORMAL
WHITE BLOOD CELLS URINE: 1 /HPF

## 2021-03-30 ENCOUNTER — APPOINTMENT (OUTPATIENT)
Dept: UROGYNECOLOGY | Facility: CLINIC | Age: 31
End: 2021-03-30
Payer: COMMERCIAL

## 2021-03-30 ENCOUNTER — OUTPATIENT (OUTPATIENT)
Dept: OUTPATIENT SERVICES | Facility: HOSPITAL | Age: 31
LOS: 1 days | End: 2021-03-30
Payer: COMMERCIAL

## 2021-03-30 DIAGNOSIS — R31.29 OTHER MICROSCOPIC HEMATURIA: ICD-10-CM

## 2021-03-30 DIAGNOSIS — Z01.818 ENCOUNTER FOR OTHER PREPROCEDURAL EXAMINATION: ICD-10-CM

## 2021-03-30 LAB
BILIRUB UR QL STRIP: NORMAL
GLUCOSE UR-MCNC: NORMAL
HCG UR QL: 1 EU/DL
HGB UR QL STRIP.AUTO: NORMAL
KETONES UR-MCNC: NORMAL
LEUKOCYTE ESTERASE UR QL STRIP: NORMAL
NITRITE UR QL STRIP: NORMAL
PH UR STRIP: 5.5
PROT UR STRIP-MCNC: 30
SP GR UR STRIP: 1.02

## 2021-03-30 PROCEDURE — 52000 CYSTOURETHROSCOPY: CPT

## 2021-04-12 ENCOUNTER — NON-APPOINTMENT (OUTPATIENT)
Age: 31
End: 2021-04-12

## 2021-04-13 ENCOUNTER — RESULT REVIEW (OUTPATIENT)
Age: 31
End: 2021-04-13

## 2021-04-13 ENCOUNTER — APPOINTMENT (OUTPATIENT)
Dept: ULTRASOUND IMAGING | Facility: CLINIC | Age: 31
End: 2021-04-13
Payer: COMMERCIAL

## 2021-04-13 ENCOUNTER — OUTPATIENT (OUTPATIENT)
Dept: OUTPATIENT SERVICES | Facility: HOSPITAL | Age: 31
LOS: 1 days | End: 2021-04-13
Payer: COMMERCIAL

## 2021-04-13 DIAGNOSIS — N39.0 URINARY TRACT INFECTION, SITE NOT SPECIFIED: ICD-10-CM

## 2021-04-13 DIAGNOSIS — R31.29 OTHER MICROSCOPIC HEMATURIA: ICD-10-CM

## 2021-04-13 PROCEDURE — 76775 US EXAM ABDO BACK WALL LIM: CPT

## 2021-04-13 PROCEDURE — 76856 US EXAM PELVIC COMPLETE: CPT

## 2021-04-13 PROCEDURE — 76856 US EXAM PELVIC COMPLETE: CPT | Mod: 26

## 2021-04-13 PROCEDURE — 76830 TRANSVAGINAL US NON-OB: CPT | Mod: 26

## 2021-04-13 PROCEDURE — 76775 US EXAM ABDO BACK WALL LIM: CPT | Mod: 26

## 2021-04-13 PROCEDURE — 76830 TRANSVAGINAL US NON-OB: CPT

## 2021-04-14 ENCOUNTER — NON-APPOINTMENT (OUTPATIENT)
Age: 31
End: 2021-04-14

## 2021-05-18 ENCOUNTER — APPOINTMENT (OUTPATIENT)
Dept: INTERNAL MEDICINE | Facility: CLINIC | Age: 31
End: 2021-05-18
Payer: COMMERCIAL

## 2021-05-18 VITALS
WEIGHT: 183 LBS | SYSTOLIC BLOOD PRESSURE: 100 MMHG | HEIGHT: 67 IN | OXYGEN SATURATION: 98 % | TEMPERATURE: 98 F | DIASTOLIC BLOOD PRESSURE: 70 MMHG | BODY MASS INDEX: 28.72 KG/M2 | HEART RATE: 68 BPM

## 2021-05-18 VITALS
TEMPERATURE: 98 F | SYSTOLIC BLOOD PRESSURE: 100 MMHG | HEIGHT: 67 IN | DIASTOLIC BLOOD PRESSURE: 70 MMHG | BODY MASS INDEX: 28.72 KG/M2 | HEART RATE: 68 BPM | RESPIRATION RATE: 15 BRPM | WEIGHT: 183 LBS | OXYGEN SATURATION: 98 %

## 2021-05-18 PROCEDURE — G0442 ANNUAL ALCOHOL SCREEN 15 MIN: CPT | Mod: NC,59

## 2021-05-18 PROCEDURE — 99395 PREV VISIT EST AGE 18-39: CPT

## 2021-05-18 PROCEDURE — G0444 DEPRESSION SCREEN ANNUAL: CPT | Mod: NC,59

## 2021-05-18 PROCEDURE — 99072 ADDL SUPL MATRL&STAF TM PHE: CPT

## 2021-05-18 RX ORDER — NITROFURANTOIN (MONOHYDRATE/MACROCRYSTALS) 25; 75 MG/1; MG/1
100 CAPSULE ORAL
Qty: 10 | Refills: 0 | Status: DISCONTINUED | COMMUNITY
Start: 2021-03-12 | End: 2021-05-18

## 2021-05-18 RX ORDER — PHENAZOPYRIDINE HYDROCHLORIDE 200 MG/1
200 TABLET ORAL 3 TIMES DAILY
Qty: 12 | Refills: 1 | Status: DISCONTINUED | COMMUNITY
Start: 2021-03-10 | End: 2021-05-18

## 2021-05-18 RX ORDER — SULFAMETHOXAZOLE AND TRIMETHOPRIM 800; 160 MG/1; MG/1
800-160 TABLET ORAL TWICE DAILY
Qty: 6 | Refills: 0 | Status: DISCONTINUED | COMMUNITY
Start: 2021-04-05 | End: 2021-05-18

## 2021-05-18 NOTE — HISTORY OF PRESENT ILLNESS
[Health Maintenance] : health maintenance [___ Year(s) Ago] : [unfilled] year(s) ago [Unmarried] : unmarried [Working Full Time] : working full time [Never Smoked Cigarettes] : has never smoked cigarettes [Occasional Use] : occasional alcohol use [None] : The patient has no concerns about alcohol abuse [Never] : has never used illicit drugs [Good] : good [Reg. Dental Visits] : She has regular dental visits [Healthy Diet] : She consumes a diverse and healthy diet [Regular Exercise] : She exercises regularly [Drug Abuse] : She uses illicit drugs [Sexually Active] : she is sexually active [Monogamous (Male Partner)] : is monogamous with a male partner [Reviewed and Updated] : risk screening reviewed and updated [Vision Problems] : She denies vision problems [Hearing Loss] : She denies hearing loss [Weight Concerns] : She does not have any weight concerns [Tobacco Use] : She does not use tobacco [Alcohol Use] : She denies alcohol use

## 2021-05-18 NOTE — DISCUSSION/SUMMARY
[Normal Weight (BMI <25)] : normal weight - BMI <25 [Non - Smoker] : non-smoker [FreeTextEntry1] : \par 30 year old woman presents for CPE.\par \par 1. HCM: Vaccines - Tdap up to date; offered Gardasil but pt prefers to read up on this some more before deciding; due for PAP testing - pt prefers female GYN - will recommend Dr. Walker; offered HIV screening but pt declined; counselled pt on safe sex practices, condom use, etc\par \par Depression screen performed today, PHQ2=0\par \par Annual alcohol misuse screen, 15 mins, done; negative.\par

## 2021-05-18 NOTE — PHYSICAL EXAM
[General Appearance - Alert] : alert [General Appearance - In No Acute Distress] : in no acute distress [Sclera] : the sclera and conjunctiva were normal [Extraocular Movements] : extraocular movements were intact [PERRL With Normal Accommodation] : pupils were equal in size, round, and reactive to light [Neck Appearance] : the appearance of the neck was normal [Neck Cervical Mass (___cm)] : no neck mass was observed [Jugular Venous Distention Increased] : there was no jugular-venous distention [Thyroid Diffuse Enlargement] : the thyroid was not enlarged [Thyroid Nodule] : there were no palpable thyroid nodules [Auscultation Breath Sounds / Voice Sounds] : lungs were clear to auscultation bilaterally [Heart Rate And Rhythm] : heart rate was normal and rhythm regular [Heart Sounds] : normal S1 and S2 [Heart Sounds Gallop] : no gallops [Murmurs] : no murmurs [Heart Sounds Pericardial Friction Rub] : no pericardial rub [Full Pulse] : the pedal pulses are present [Edema] : there was no peripheral edema [Bowel Sounds] : normal bowel sounds [Abdomen Soft] : soft [Abdomen Tenderness] : non-tender [Abdomen Mass (___ Cm)] : no abdominal mass palpated [Cervical Lymph Nodes Enlarged Posterior Bilaterally] : posterior cervical [Cervical Lymph Nodes Enlarged Anterior Bilaterally] : anterior cervical [Supraclavicular Lymph Nodes Enlarged Bilaterally] : supraclavicular [No CVA Tenderness] : no ~M costovertebral angle tenderness [No Spinal Tenderness] : no spinal tenderness [Abnormal Walk] : normal gait [Nail Clubbing] : no clubbing  or cyanosis of the fingernails [Involuntary Movements] : no involuntary movements were seen [Musculoskeletal - Swelling] : no joint swelling seen [Motor Tone] : muscle strength and tone were normal [] : no rash [Sensation] : the sensory exam was normal to light touch and pinprick [Motor Exam] : the motor exam was normal [No Focal Deficits] : no focal deficits [Oriented To Time, Place, And Person] : oriented to person, place, and time [Impaired Insight] : insight and judgment were intact [Affect] : the affect was normal

## 2021-05-21 LAB
ALBUMIN SERPL ELPH-MCNC: 4.8 G/DL
ALP BLD-CCNC: 50 U/L
ALT SERPL-CCNC: 9 U/L
ANION GAP SERPL CALC-SCNC: 13 MMOL/L
AST SERPL-CCNC: 12 U/L
BASOPHILS # BLD AUTO: 0.04 K/UL
BASOPHILS NFR BLD AUTO: 0.6 %
BILIRUB SERPL-MCNC: 0.6 MG/DL
BUN SERPL-MCNC: 11 MG/DL
CALCIUM SERPL-MCNC: 9.6 MG/DL
CHLORIDE SERPL-SCNC: 104 MMOL/L
CHOLEST SERPL-MCNC: 170 MG/DL
CO2 SERPL-SCNC: 22 MMOL/L
CREAT SERPL-MCNC: 0.66 MG/DL
EOSINOPHIL # BLD AUTO: 0.16 K/UL
EOSINOPHIL NFR BLD AUTO: 2.6 %
ESTIMATED AVERAGE GLUCOSE: 105 MG/DL
GLUCOSE SERPL-MCNC: 97 MG/DL
HBA1C MFR BLD HPLC: 5.3 %
HCT VFR BLD CALC: 41.2 %
HDLC SERPL-MCNC: 59 MG/DL
HGB BLD-MCNC: 13.4 G/DL
HIV1+2 AB SPEC QL IA.RAPID: NONREACTIVE
IMM GRANULOCYTES NFR BLD AUTO: 0.2 %
LDLC SERPL CALC-MCNC: 96 MG/DL
LYMPHOCYTES # BLD AUTO: 1.88 K/UL
LYMPHOCYTES NFR BLD AUTO: 30.1 %
MAN DIFF?: NORMAL
MCHC RBC-ENTMCNC: 27.7 PG
MCHC RBC-ENTMCNC: 32.5 GM/DL
MCV RBC AUTO: 85.1 FL
MONOCYTES # BLD AUTO: 0.58 K/UL
MONOCYTES NFR BLD AUTO: 9.3 %
NEUTROPHILS # BLD AUTO: 3.57 K/UL
NEUTROPHILS NFR BLD AUTO: 57.2 %
NONHDLC SERPL-MCNC: 111 MG/DL
PLATELET # BLD AUTO: 226 K/UL
POTASSIUM SERPL-SCNC: 3.8 MMOL/L
PROT SERPL-MCNC: 7.3 G/DL
RBC # BLD: 4.84 M/UL
RBC # FLD: 13.3 %
SODIUM SERPL-SCNC: 139 MMOL/L
TRIGL SERPL-MCNC: 75 MG/DL
TSH SERPL-ACNC: 2.08 UIU/ML
WBC # FLD AUTO: 6.24 K/UL

## 2021-07-07 ENCOUNTER — APPOINTMENT (OUTPATIENT)
Dept: UROGYNECOLOGY | Facility: CLINIC | Age: 31
End: 2021-07-07

## 2021-09-27 ENCOUNTER — RX RENEWAL (OUTPATIENT)
Age: 31
End: 2021-09-27

## 2021-09-27 RX ORDER — TRIMETHOPRIM 100 MG/1
100 TABLET ORAL
Qty: 90 | Refills: 0 | Status: ACTIVE | COMMUNITY
Start: 2021-04-13 | End: 1900-01-01

## 2021-11-29 ENCOUNTER — TRANSCRIPTION ENCOUNTER (OUTPATIENT)
Age: 31
End: 2021-11-29

## 2021-12-10 ENCOUNTER — TRANSCRIPTION ENCOUNTER (OUTPATIENT)
Age: 31
End: 2021-12-10

## 2021-12-13 ENCOUNTER — APPOINTMENT (OUTPATIENT)
Dept: INTERNAL MEDICINE | Facility: CLINIC | Age: 31
End: 2021-12-13
Payer: COMMERCIAL

## 2021-12-13 VITALS
HEART RATE: 58 BPM | OXYGEN SATURATION: 98 % | HEIGHT: 67 IN | TEMPERATURE: 98.2 F | BODY MASS INDEX: 28.41 KG/M2 | WEIGHT: 181 LBS | SYSTOLIC BLOOD PRESSURE: 97 MMHG | DIASTOLIC BLOOD PRESSURE: 59 MMHG

## 2021-12-13 DIAGNOSIS — J32.9 CHRONIC RHINITIS: ICD-10-CM

## 2021-12-13 DIAGNOSIS — J31.0 CHRONIC RHINITIS: ICD-10-CM

## 2021-12-13 DIAGNOSIS — R53.83 OTHER FATIGUE: ICD-10-CM

## 2021-12-13 PROCEDURE — 36415 COLL VENOUS BLD VENIPUNCTURE: CPT

## 2021-12-13 PROCEDURE — 99213 OFFICE O/P EST LOW 20 MIN: CPT | Mod: 25

## 2021-12-13 NOTE — ASSESSMENT
[FreeTextEntry1] : Patient is a 31-year-old female who presents with one month history of rhinorrhea, sore throat and fatigue\par \par 1 viral URI rhino sinusitis\par again will check rule out COVID viral panel\par start Flonase two sprays each nostril and Atrovent nasal spray with saline nasal spray\par observe for now check CBC C-reactive protein\par \par 2 fatigue\par check thyroid function test CBC observe

## 2021-12-13 NOTE — REVIEW OF SYSTEMS
[Fever] : no fever [Chills] : no chills [Fatigue] : fatigue [Hot Flashes] : no hot flashes [Night Sweats] : no night sweats [Recent Change In Weight] : ~T no recent weight change [Earache] : no earache [Hearing Loss] : no hearing loss [Nosebleed] : no nosebleeds [Hoarseness] : no hoarseness [Nasal Discharge] : nasal discharge [Sore Throat] : sore throat [Postnasal Drip] : postnasal drip [Negative] : Integumentary

## 2021-12-13 NOTE — PHYSICAL EXAM
[Normal Sclera/Conjunctiva] : normal sclera/conjunctiva [Normal Outer Ear/Nose] : the outer ears and nose were normal in appearance [Normal Oropharynx] : the oropharynx was normal [Normal] : no respiratory distress, lungs were clear to auscultation bilaterally and no accessory muscle use [de-identified] : Mild erythema noticed right ear cerumen

## 2021-12-13 NOTE — HISTORY OF PRESENT ILLNESS
[FreeTextEntry8] : CC: nasal congestion sore throat for one month\par \par HPI the patient is a healthy 31-year-old woman who presented to urgent care center with one week history of sore throat nasal congestion patient was treated conservatively as a viral syndrome however returned following two weeks with similar symptoms sore throat and nasal congestion denies cough, shortness of breath, fever, chills, diarrhea, dysuria, but also notes severe fatigue. Patient was started on doxycycline by urgent care center.

## 2021-12-15 LAB
BASOPHILS # BLD AUTO: 0.05 K/UL
BASOPHILS NFR BLD AUTO: 0.7 %
CRP SERPL-MCNC: <3 MG/L
EOSINOPHIL # BLD AUTO: 0.23 K/UL
EOSINOPHIL NFR BLD AUTO: 3.2 %
HCT VFR BLD CALC: 40.5 %
HGB BLD-MCNC: 12.6 G/DL
IMM GRANULOCYTES NFR BLD AUTO: 0.3 %
LYMPHOCYTES # BLD AUTO: 2.54 K/UL
LYMPHOCYTES NFR BLD AUTO: 34.9 %
MAN DIFF?: NORMAL
MCHC RBC-ENTMCNC: 26.6 PG
MCHC RBC-ENTMCNC: 31.1 GM/DL
MCV RBC AUTO: 85.6 FL
MONOCYTES # BLD AUTO: 0.64 K/UL
MONOCYTES NFR BLD AUTO: 8.8 %
NEUTROPHILS # BLD AUTO: 3.8 K/UL
NEUTROPHILS NFR BLD AUTO: 52.1 %
PLATELET # BLD AUTO: 276 K/UL
RAPID RVP RESULT: DETECTED
RBC # BLD: 4.73 M/UL
RBC # FLD: 14.2 %
RV+EV RNA SPEC QL NAA+PROBE: DETECTED
SARS-COV-2 RNA PNL RESP NAA+PROBE: NOT DETECTED
TSH SERPL-ACNC: 2.78 UIU/ML
WBC # FLD AUTO: 7.28 K/UL

## 2021-12-20 RX ORDER — IPRATROPIUM BROMIDE 42 UG/1
0.06 SPRAY NASAL
Qty: 1 | Refills: 3 | Status: COMPLETED | COMMUNITY
Start: 2021-12-20 | End: 2022-01-29

## 2022-05-31 ENCOUNTER — APPOINTMENT (OUTPATIENT)
Dept: INTERNAL MEDICINE | Facility: CLINIC | Age: 32
End: 2022-05-31

## 2022-06-07 ENCOUNTER — APPOINTMENT (OUTPATIENT)
Dept: INTERNAL MEDICINE | Facility: CLINIC | Age: 32
End: 2022-06-07
Payer: COMMERCIAL

## 2022-06-07 VITALS
DIASTOLIC BLOOD PRESSURE: 66 MMHG | HEIGHT: 67 IN | BODY MASS INDEX: 28.88 KG/M2 | WEIGHT: 184 LBS | SYSTOLIC BLOOD PRESSURE: 101 MMHG | OXYGEN SATURATION: 98 % | HEART RATE: 61 BPM | TEMPERATURE: 97.5 F

## 2022-06-07 DIAGNOSIS — Z13.31 ENCOUNTER FOR SCREENING FOR DEPRESSION: ICD-10-CM

## 2022-06-07 DIAGNOSIS — Z13.39 ENCOUNTER FOR SCREENING EXAM FOR OTHER MENTAL HEALTH AND BEHAVIORAL DISORDERS: ICD-10-CM

## 2022-06-07 DIAGNOSIS — Z00.00 ENCOUNTER FOR GENERAL ADULT MEDICAL EXAMINATION W/OUT ABNORMAL FINDINGS: ICD-10-CM

## 2022-06-07 PROCEDURE — G0444 DEPRESSION SCREEN ANNUAL: CPT | Mod: 59

## 2022-06-07 PROCEDURE — 99395 PREV VISIT EST AGE 18-39: CPT

## 2022-06-07 PROCEDURE — G0442 ANNUAL ALCOHOL SCREEN 15 MIN: CPT

## 2022-06-07 NOTE — PHYSICAL EXAM
[General Appearance - Alert] : alert [General Appearance - In No Acute Distress] : in no acute distress [Sclera] : the sclera and conjunctiva were normal [PERRL With Normal Accommodation] : pupils were equal in size, round, and reactive to light [Extraocular Movements] : extraocular movements were intact [Neck Appearance] : the appearance of the neck was normal [Neck Cervical Mass (___cm)] : no neck mass was observed [Jugular Venous Distention Increased] : there was no jugular-venous distention [Thyroid Diffuse Enlargement] : the thyroid was not enlarged [Thyroid Nodule] : there were no palpable thyroid nodules [Auscultation Breath Sounds / Voice Sounds] : lungs were clear to auscultation bilaterally [Heart Rate And Rhythm] : heart rate was normal and rhythm regular [Heart Sounds] : normal S1 and S2 [Heart Sounds Gallop] : no gallops [Murmurs] : no murmurs [Heart Sounds Pericardial Friction Rub] : no pericardial rub [Full Pulse] : the pedal pulses are present [Edema] : there was no peripheral edema [Bowel Sounds] : normal bowel sounds [Abdomen Soft] : soft [Abdomen Tenderness] : non-tender [Abdomen Mass (___ Cm)] : no abdominal mass palpated [Cervical Lymph Nodes Enlarged Posterior Bilaterally] : posterior cervical [Cervical Lymph Nodes Enlarged Anterior Bilaterally] : anterior cervical [Supraclavicular Lymph Nodes Enlarged Bilaterally] : supraclavicular [No CVA Tenderness] : no ~M costovertebral angle tenderness [No Spinal Tenderness] : no spinal tenderness [Abnormal Walk] : normal gait [Nail Clubbing] : no clubbing  or cyanosis of the fingernails [Involuntary Movements] : no involuntary movements were seen [Musculoskeletal - Swelling] : no joint swelling seen [Motor Tone] : muscle strength and tone were normal [] : no rash [Sensation] : the sensory exam was normal to light touch and pinprick [Motor Exam] : the motor exam was normal [No Focal Deficits] : no focal deficits [Oriented To Time, Place, And Person] : oriented to person, place, and time [Impaired Insight] : insight and judgment were intact [Affect] : the affect was normal

## 2022-06-07 NOTE — DISCUSSION/SUMMARY
[Normal Weight (BMI <25)] : normal weight - BMI <25 [Non - Smoker] : non-smoker [FreeTextEntry1] : \par 31 year old woman presents for CPE.\par \par 1. HCM: Vaccines - Tdap up to date; offered Gardasil but pt prefers to read up on this some more before deciding; due for PAP testing - pt prefers female GYN - will recommend Dr. Walker; offered HIV screening but pt declined; counselled pt on safe sex practices, condom use, etc\par \par Depression screen performed today, PHQ2=0\par \par Annual alcohol misuse screen, 15 mins, done; negative.\par

## 2022-06-14 LAB
ALBUMIN SERPL ELPH-MCNC: 5.1 G/DL
ALP BLD-CCNC: 43 U/L
ALT SERPL-CCNC: 14 U/L
ANION GAP SERPL CALC-SCNC: 14 MMOL/L
AST SERPL-CCNC: 21 U/L
BASOPHILS # BLD AUTO: 0.05 K/UL
BASOPHILS NFR BLD AUTO: 0.7 %
BILIRUB SERPL-MCNC: 0.5 MG/DL
BUN SERPL-MCNC: 12 MG/DL
CALCIUM SERPL-MCNC: 9.3 MG/DL
CHLORIDE SERPL-SCNC: 100 MMOL/L
CHOLEST SERPL-MCNC: 161 MG/DL
CO2 SERPL-SCNC: 25 MMOL/L
CREAT SERPL-MCNC: 0.71 MG/DL
EGFR: 117 ML/MIN/1.73M2
EOSINOPHIL # BLD AUTO: 0.21 K/UL
EOSINOPHIL NFR BLD AUTO: 2.9 %
ESTIMATED AVERAGE GLUCOSE: 111 MG/DL
GLUCOSE SERPL-MCNC: 90 MG/DL
HBA1C MFR BLD HPLC: 5.5 %
HCT VFR BLD CALC: 37.9 %
HDLC SERPL-MCNC: 57 MG/DL
HGB BLD-MCNC: 11.6 G/DL
HIV1+2 AB SPEC QL IA.RAPID: NONREACTIVE
IMM GRANULOCYTES NFR BLD AUTO: 0.1 %
LDLC SERPL CALC-MCNC: 86 MG/DL
LYMPHOCYTES # BLD AUTO: 2.34 K/UL
LYMPHOCYTES NFR BLD AUTO: 32.6 %
MAN DIFF?: NORMAL
MCHC RBC-ENTMCNC: 25.4 PG
MCHC RBC-ENTMCNC: 30.6 GM/DL
MCV RBC AUTO: 82.9 FL
MONOCYTES # BLD AUTO: 0.62 K/UL
MONOCYTES NFR BLD AUTO: 8.6 %
NEUTROPHILS # BLD AUTO: 3.94 K/UL
NEUTROPHILS NFR BLD AUTO: 55.1 %
NONHDLC SERPL-MCNC: 105 MG/DL
PLATELET # BLD AUTO: 249 K/UL
POTASSIUM SERPL-SCNC: 4.4 MMOL/L
PROT SERPL-MCNC: 7.6 G/DL
RBC # BLD: 4.57 M/UL
RBC # FLD: 14.3 %
SODIUM SERPL-SCNC: 139 MMOL/L
TRIGL SERPL-MCNC: 94 MG/DL
TSH SERPL-ACNC: 1.83 UIU/ML
WBC # FLD AUTO: 7.17 K/UL

## 2022-06-23 ENCOUNTER — NON-APPOINTMENT (OUTPATIENT)
Age: 32
End: 2022-06-23

## 2022-12-07 ENCOUNTER — APPOINTMENT (OUTPATIENT)
Dept: OBGYN | Facility: CLINIC | Age: 32
End: 2022-12-07

## 2022-12-07 VITALS
HEIGHT: 67 IN | WEIGHT: 172 LBS | SYSTOLIC BLOOD PRESSURE: 99 MMHG | DIASTOLIC BLOOD PRESSURE: 61 MMHG | BODY MASS INDEX: 27 KG/M2

## 2022-12-07 DIAGNOSIS — Z01.419 ENCOUNTER FOR GYNECOLOGICAL EXAMINATION (GENERAL) (ROUTINE) W/OUT ABNORMAL FINDINGS: ICD-10-CM

## 2022-12-07 DIAGNOSIS — Z87.898 PERSONAL HISTORY OF OTHER SPECIFIED CONDITIONS: ICD-10-CM

## 2022-12-07 DIAGNOSIS — N39.0 URINARY TRACT INFECTION, SITE NOT SPECIFIED: ICD-10-CM

## 2022-12-07 DIAGNOSIS — R10.2 PELVIC AND PERINEAL PAIN: ICD-10-CM

## 2022-12-07 PROCEDURE — 99395 PREV VISIT EST AGE 18-39: CPT

## 2022-12-08 LAB
C TRACH RRNA SPEC QL NAA+PROBE: NOT DETECTED
N GONORRHOEA RRNA SPEC QL NAA+PROBE: NOT DETECTED
SOURCE AMPLIFICATION: NORMAL

## 2022-12-09 LAB — HPV HIGH+LOW RISK DNA PNL CVX: NOT DETECTED

## 2022-12-20 LAB — CYTOLOGY CVX/VAG DOC THIN PREP: NORMAL

## 2023-06-13 ENCOUNTER — APPOINTMENT (OUTPATIENT)
Dept: INTERNAL MEDICINE | Facility: CLINIC | Age: 33
End: 2023-06-13

## 2024-01-26 ENCOUNTER — APPOINTMENT (OUTPATIENT)
Dept: ANTEPARTUM | Facility: CLINIC | Age: 34
End: 2024-01-26
Payer: COMMERCIAL

## 2024-01-26 ENCOUNTER — ASOB RESULT (OUTPATIENT)
Age: 34
End: 2024-01-26

## 2024-01-26 PROCEDURE — 76813 OB US NUCHAL MEAS 1 GEST: CPT

## 2024-01-26 PROCEDURE — 76801 OB US < 14 WKS SINGLE FETUS: CPT

## 2024-03-20 ENCOUNTER — APPOINTMENT (OUTPATIENT)
Dept: ANTEPARTUM | Facility: CLINIC | Age: 34
End: 2024-03-20
Payer: COMMERCIAL

## 2024-03-20 ENCOUNTER — ASOB RESULT (OUTPATIENT)
Age: 34
End: 2024-03-20

## 2024-03-20 PROCEDURE — 76805 OB US >/= 14 WKS SNGL FETUS: CPT

## 2024-06-12 ENCOUNTER — APPOINTMENT (OUTPATIENT)
Dept: ANTEPARTUM | Facility: CLINIC | Age: 34
End: 2024-06-12
Payer: COMMERCIAL

## 2024-06-12 ENCOUNTER — ASOB RESULT (OUTPATIENT)
Age: 34
End: 2024-06-12

## 2024-06-12 PROCEDURE — 76816 OB US FOLLOW-UP PER FETUS: CPT

## 2024-07-24 ENCOUNTER — APPOINTMENT (OUTPATIENT)
Dept: ANTEPARTUM | Facility: CLINIC | Age: 34
End: 2024-07-24
Payer: COMMERCIAL

## 2024-07-24 ENCOUNTER — ASOB RESULT (OUTPATIENT)
Age: 34
End: 2024-07-24

## 2024-07-24 PROCEDURE — 76816 OB US FOLLOW-UP PER FETUS: CPT

## 2024-07-24 PROCEDURE — 76819 FETAL BIOPHYS PROFIL W/O NST: CPT

## 2024-08-01 ENCOUNTER — INPATIENT (INPATIENT)
Facility: HOSPITAL | Age: 34
LOS: 2 days | Discharge: HOME | End: 2024-08-04
Attending: OBSTETRICS & GYNECOLOGY | Admitting: OBSTETRICS & GYNECOLOGY
Payer: COMMERCIAL

## 2024-08-01 VITALS — HEART RATE: 87 BPM | OXYGEN SATURATION: 98 %

## 2024-08-01 DIAGNOSIS — O26.899 OTHER SPECIFIED PREGNANCY RELATED CONDITIONS, UNSPECIFIED TRIMESTER: ICD-10-CM

## 2024-08-01 DIAGNOSIS — Z34.80 ENCOUNTER FOR SUPERVISION OF OTHER NORMAL PREGNANCY, UNSPECIFIED TRIMESTER: ICD-10-CM

## 2024-08-01 DIAGNOSIS — O42.90 PREMATURE RUPTURE OF MEMBRANES, UNSPECIFIED AS TO LENGTH OF TIME BETWEEN RUPTURE AND ONSET OF LABOR, UNSPECIFIED WEEKS OF GESTATION: ICD-10-CM

## 2024-08-01 LAB
BASOPHILS # BLD AUTO: 0.04 K/UL — SIGNIFICANT CHANGE UP (ref 0–0.2)
BASOPHILS NFR BLD AUTO: 0.4 % — SIGNIFICANT CHANGE UP (ref 0–2)
BLD GP AB SCN SERPL QL: NEGATIVE — SIGNIFICANT CHANGE UP
EOSINOPHIL # BLD AUTO: 0.07 K/UL — SIGNIFICANT CHANGE UP (ref 0–0.5)
EOSINOPHIL NFR BLD AUTO: 0.7 % — SIGNIFICANT CHANGE UP (ref 0–6)
HCT VFR BLD CALC: 36.2 % — SIGNIFICANT CHANGE UP (ref 34.5–45)
HGB BLD-MCNC: 11.3 G/DL — LOW (ref 11.5–15.5)
IMM GRANULOCYTES NFR BLD AUTO: 1 % — HIGH (ref 0–0.9)
LYMPHOCYTES # BLD AUTO: 1.74 K/UL — SIGNIFICANT CHANGE UP (ref 1–3.3)
LYMPHOCYTES # BLD AUTO: 18.5 % — SIGNIFICANT CHANGE UP (ref 13–44)
MCHC RBC-ENTMCNC: 23.6 PG — LOW (ref 27–34)
MCHC RBC-ENTMCNC: 31.2 GM/DL — LOW (ref 32–36)
MCV RBC AUTO: 75.6 FL — LOW (ref 80–100)
MONOCYTES # BLD AUTO: 0.83 K/UL — SIGNIFICANT CHANGE UP (ref 0–0.9)
MONOCYTES NFR BLD AUTO: 8.8 % — SIGNIFICANT CHANGE UP (ref 2–14)
NEUTROPHILS # BLD AUTO: 6.65 K/UL — SIGNIFICANT CHANGE UP (ref 1.8–7.4)
NEUTROPHILS NFR BLD AUTO: 70.6 % — SIGNIFICANT CHANGE UP (ref 43–77)
NRBC # BLD: 0 /100 WBCS — SIGNIFICANT CHANGE UP (ref 0–0)
PLATELET # BLD AUTO: 208 K/UL — SIGNIFICANT CHANGE UP (ref 150–400)
RBC # BLD: 4.79 M/UL — SIGNIFICANT CHANGE UP (ref 3.8–5.2)
RBC # FLD: 14.9 % — HIGH (ref 10.3–14.5)
RH IG SCN BLD-IMP: POSITIVE — SIGNIFICANT CHANGE UP
RH IG SCN BLD-IMP: POSITIVE — SIGNIFICANT CHANGE UP
WBC # BLD: 9.42 K/UL — SIGNIFICANT CHANGE UP (ref 3.8–10.5)
WBC # FLD AUTO: 9.42 K/UL — SIGNIFICANT CHANGE UP (ref 3.8–10.5)

## 2024-08-01 RX ORDER — TRISODIUM CITRATE DIHYDRATE AND CITRIC ACID MONOHYDRATE 500; 334 MG/5ML; MG/5ML
15 SOLUTION ORAL EVERY 6 HOURS
Refills: 0 | Status: DISCONTINUED | OUTPATIENT
Start: 2024-08-01 | End: 2024-08-02

## 2024-08-01 RX ORDER — DEXTROSE MONOHYDRATE, SODIUM CHLORIDE, SODIUM LACTATE, CALCIUM CHLORIDE, MAGNESIUM CHLORIDE 1.5; 538; 448; 18.4; 5.08 G/100ML; MG/100ML; MG/100ML; MG/100ML; MG/100ML
1000 SOLUTION INTRAPERITONEAL
Refills: 0 | Status: DISCONTINUED | OUTPATIENT
Start: 2024-08-01 | End: 2024-08-02

## 2024-08-01 RX ORDER — OXYTOCIN/RINGER'S LACTATE 20/1000 ML
333.33 PLASTIC BAG, INJECTION (ML) INTRAVENOUS
Qty: 20 | Refills: 0 | Status: DISCONTINUED | OUTPATIENT
Start: 2024-08-01 | End: 2024-08-02

## 2024-08-01 RX ORDER — AMPICILLIN 1 G/1
2 INJECTION, POWDER, FOR SOLUTION INTRAMUSCULAR; INTRAVENOUS ONCE
Refills: 0 | Status: COMPLETED | OUTPATIENT
Start: 2024-08-01 | End: 2024-08-01

## 2024-08-01 RX ORDER — AMPICILLIN 1 G/1
1 INJECTION, POWDER, FOR SOLUTION INTRAMUSCULAR; INTRAVENOUS EVERY 4 HOURS
Refills: 0 | Status: DISCONTINUED | OUTPATIENT
Start: 2024-08-01 | End: 2024-08-02

## 2024-08-01 RX ORDER — CHLORHEXIDINE GLUCONATE 500 MG/1
1 CLOTH TOPICAL DAILY
Refills: 0 | Status: DISCONTINUED | OUTPATIENT
Start: 2024-08-01 | End: 2024-08-02

## 2024-08-01 RX ORDER — OXYTOCIN/RINGER'S LACTATE 20/1000 ML
4 PLASTIC BAG, INJECTION (ML) INTRAVENOUS
Qty: 30 | Refills: 0 | Status: DISCONTINUED | OUTPATIENT
Start: 2024-08-01 | End: 2024-08-02

## 2024-08-01 RX ADMIN — AMPICILLIN 200 GRAM(S): 1 INJECTION, POWDER, FOR SOLUTION INTRAMUSCULAR; INTRAVENOUS at 13:00

## 2024-08-01 RX ADMIN — DEXTROSE MONOHYDRATE, SODIUM CHLORIDE, SODIUM LACTATE, CALCIUM CHLORIDE, MAGNESIUM CHLORIDE 125 MILLILITER(S): 1.5; 538; 448; 18.4; 5.08 SOLUTION INTRAPERITONEAL at 13:00

## 2024-08-01 RX ADMIN — AMPICILLIN 108 GRAM(S): 1 INJECTION, POWDER, FOR SOLUTION INTRAMUSCULAR; INTRAVENOUS at 17:00

## 2024-08-01 RX ADMIN — AMPICILLIN 108 GRAM(S): 1 INJECTION, POWDER, FOR SOLUTION INTRAMUSCULAR; INTRAVENOUS at 21:10

## 2024-08-01 NOTE — OB PROVIDER H&P - NS_TRIAGEMEMBRANE_OBGYN_ALL_OB
Ruptured Ear Wedge Repair Text: A wedge excision was completed by carrying down an excision through the full thickness of the ear and cartilage with an inward facing Burow's triangle. The wound was then closed in a layered fashion.

## 2024-08-01 NOTE — OB RN PATIENT PROFILE - WEIGHT: TOTAL WEIGHT IN KG
Patient: Kyle Yuan Date: 7/10/2019   : 1956 Attending: Dmitriy Davies MD   62 year old female Room: 333/01       Post-op Day #: 2    Surgical Procedure: S/P Coronary artery bypass grafting x2 with left internal mammary artery to left anterior descending, saphenous vein graft distal right.  Endoscopic vein harvesting greater left saphenous.    Hospital Course: Patient is a 62 year old female with coronary artery disease low EF who was brought to the OR by Dr. Davies for above procedure. Patient unable to have intra-aortic balloon pump placed due to the moderate AI. Patient was recovered in the ICU. Patient remained intubated overnight and on inotropic support Day #1: Patient hemodynamically stable on 2 mcg of epinephrine. We will wean patient and assess for appropriateness to extubate. Patient rhythm stable. Making fair urine. Chest tube output low. Day #2: Patient hypoxic and lower urine output suspect low cardiac function. Inotropic support started. High SVR, therapies goal to reduce with Coreg and ACEI. Milirinone also started instead of epinephrine due to this. ACEI held this afternoon d/t low MAP.    ALLERGIES:  No Known Allergies    Medications/Infusions:  Reviewed.    Physical Exam:   Vital     Temperature 99.3 °F (37.4 °C) (07/10/19 1230)    Pulse 74 (07/10/19 1415)    Respiratory Rate 20 (07/10/19 1230)    Non-Invasive   Blood Pressure 105/55 (07/10/19 0930)    Arterial  Blood Pressure 103/54 (07/10/19 1415)    Pulse Oximetry 97 % (07/10/19 1415)           General: Awake  HEENT: EOMI  Neurologic:  Alert, no focal deficits  Neck:  Supple  Chest:  Symmetric  Lungs:  decreased air movement  Heart:  normal rate and rhythm, S1, S2 present and no murmurs  Abdomen: bowel sounds decreased  Extremities: trace edema  Incision: Covered with minimal shadowing.    Intake/Output:    Intake/Output Summary (Last 24 hours) at 7/10/2019 1438  Last data filed at 7/10/2019 1300  Gross per 24 hour    Intake 2815 ml   Output 1805 ml   Net 1010 ml       Laboratory Results:  Recent Labs   Lab 07/10/19  0357 19  1746 19  0310   SODIUM 144  --  143   POTASSIUM 4.6  --  3.6   BUN   --  17   CREATININE 0.86  --  0.94   GLUCOSE 160*  --  167*   WBC 12.3*  --  17.7*   HGB 10.7* 11.6* 10.8*   *  --  123*       Imagin. Increasing left lung base atelectasis.  2. Subsegmental atelectasis left midlung field.  3. Tubes and catheters positioned as described above.    Assessment/Plan:      1. Coronary artery disease  2. Aortic insufficiency  3. Systolic heart failure with reduced EF  4. Status post CABG ×2  5. Postoperative anemia  6. Postoperative respiratory failure    DVT Prophylaxis: SCD's, Lovenox (poor mobility higher risk for DVT)    Bed Status: ICU    24 Hour Summary: Echo and CT scan unremarkable. Echo roughly the same since preop. Patient slightly hypoxic expected cardiac origin and lower urine output. Patient restarted on inotropic support. Anemia stable.          12

## 2024-08-01 NOTE — OB PROVIDER H&P - HISTORY OF PRESENT ILLNESS
33 year old  at 39.0 weeks presents with leaking of clear fluid since 830am, four episodes of gushes of fluid. -vaginal bleeding, -contractions, +fetal movement. Uncomplicated pregnancy. +GBS    ObHx: primigravida  MedHx: denies  SurgHx: denies  GynHx: denies fibroids, cysts, abnormal paps, STIs  SocialHx: denies ETOH, tobacco, drug use  PsychHx: denies anxiety, depression  All: NKDA, NKFA, NKEA  Meds: PNV    Vital Signs Last 24 Hrs  T(C): --  T(F): --  HR: 85 (01 Aug 2024 12:35) (79 - 89)  BP: 121/64 (01 Aug 2024 11:44) (121/64 - 121/64)  BP(mean): --  RR: --  SpO2: 98% (01 Aug 2024 12:35) (92% - 99%)    PE: NAD, AOx3, abdomen soft gravid  Spec: +pooling, +nitrazine, +fern  VE: 1.0/50/-3  EFM: 140, moderate variability, +accels, -decels  [place]: no ctxs  EFW: 3600 grams  Sono: cephalic, SUJIT 4.68

## 2024-08-01 NOTE — OB PROVIDER H&P - NSICDXPASTSURGICALHX_GEN_ALL_CORE_FT
TRANSFER - OUT REPORT:    Verbal report given to HANH Castro on Slava Stein  being transferred to ED  rm 16 for routine progression of care       Report consisted of patients Situation, Background, Assessment and   Recommendations(SBAR). Information from the following report(s) ED Summary, MAR, Recent Results and Cardiac Rhythm SR was reviewed with the receiving nurse. Lines:   Peripheral IV 08/30/21 Left Antecubital (Active)        Opportunity for questions and clarification was provided.       Patient transported with:   Monitor PAST SURGICAL HISTORY:  No significant past surgical history

## 2024-08-01 NOTE — OB PROVIDER H&P - NSLOWPPHRISK_OBGYN_A_OB
No previous uterine incision/[place] Pregnancy/Less than or equal to 4 previous vaginal births/No known bleeding disorder/No history of postpartum hemorrhage/No other PPH risks indicated

## 2024-08-01 NOTE — OB RN PATIENT PROFILE - NS_GBS_INFANT_INVASIVE_OBGYN_ALL_OB_FT
Dx: Adhesive capsulitis of left shoulder (M75.02)           Insurance (Authorized # of Visits):  8           Authorizing Physician: Dr. Linda Solitario MD visit: none scheduled  Fall Risk: standard         Precautions: n/a           Medication changes per patient? NO  Date of evaluation/PN:2024  Pain ratin/10  Subjective: Symptoms a little better than yesterday    Objective: Requires visual biofeedback to maintain core engagement and reduce excursion of upper quadrant from COG during TM retrowalk.      Assessment: Continued to progress core strengthening and proximal stabilization and tolerated well without increased symptom irritability.  Treatment today include Neuromuscular re-education and Therapeutic exercise focusing on increased core strength and proximal stability but noted with residual deficits in decreased core strength/stability and (L)UE proximal strength/stability and will be addressed with continued skilled interventions.      Goals: In progress as follows:  Sarina will demonstrate improved posture to promote proper scapular positioning and reduce tissue irritability.  Sarina will restore WNL and painfree (L) shoulder AROM to allow painfree reaching including overhead.  Sarina will have improved scapular and RC strength to 4+ to 5/5 to allow painfree lifting including overhead.  Sarina will be (I) with a home program to allow discharge from PT towards further self-recovery and maintenance of function.    Plan: Continue to monitor tissue irritability and progress core stabilization as tolerated.   Date: 7/15/2024  TX#: 2 Date: 2024              TX#: 3/8 Date: 2024              TX#:  Date: 2024             TX#:  Date:   Tx#: 6/ Date:   Tx#: 7/ Date:   Tx#: 8/ Date:   Tx#: 9/ Date:   Tx#: 10/ Date:   Tx#: 11/ Date:   Tx#: 12/ Date:   Tx#: 13/ Date:   Tx#: 14/ Date:   Tx#: 15/ Date:   Tx#: 16/   THERAPEUTIC EX 30 mins 38 mins 40 mins 40 mins              Scifit  UE only  L1 x 4 mins ea fwd/retro focus on upright posture L1 x 4 mins ea fwd/retro focusing on upright posture L3 x 4 mins ea fwd/retro focusing on upright posture               (L) shoulder PROM 15 mins                 Lat pull downs Red cord 2x10 Yellow cord 2x10                Low rows with ER Red cord 2x10 Yellow cord 2x10 Red cord 3x10               SB push up plus  RSB 2x10                Face pulls  Yellow cord 2x10 Red cord 3x10               High rows  Yellow cord 2x10                (L) shoulder D1/D2  Supine 2x15 ea                SL shoulder ER  (L) 2lbs 3x10 (L) 2lbs 3x10               (B) shoulder flexion   1lb dowel 3x10 supine               (B) shoulder ER in 90 abd   1lb dowel 3x10 supine               HL TA ball press   3x10               Prone heel squeeze   Yoga ball with knee flexion 3x10               Standing multifidi torsion   Standing on airex x 1 min               Prone V, W, horizontal abd    2 x 10 ea              HL isometric hip add with LE lifts    Yoga ball 3x10; last set with pilates 100s              HL isometric hip abd with LE lifts    Ring with yoga ball b/w ankles 3x10; last set with pilates 100s              HL bent knee fall outs    Green tband 3x10 ea              SL clamshells    Green tband 3x10 ea              HL TA ring press    Yoga ball b/w knees 3x10              TM retrowalk    12% grade 0.5 to 1mph x 10 mins with mirror biofeedback                                NEUROMUSCULAR RE-EDUCATION 8 mins 8 mins 8 mins 8 mins              Shoulder alphabet Standing at 90 flexion/scaption fist against yoga ball upper and lower case Standing at 90 flexion/scaption fist against yoga ball upper and lower case                Rhythmic stabilization Supine at 90 and 110 flexion 2 x 30 secs ea Supine at 90 and 110 flexion 2 x 60 secs ea Standing on airex with fist on yoga ball on wall and 90 flexion and scaption 2 x 1 min ea Standing on airex with fist on yoga ball on wall and 90  flexion and scaption 2 x 1 min ea              HL multifidi isometrics   Leaky tire cheat with self-biofeedback x 4 mins               Standing with forehead on ball on wall    V, W, horizontal abd 2x10 with 3 sec hold ea                                MANUAL TX 8 mins                 Joint mobilization Thoracic PA's gr 3-4; (L) scapulothoracic all planes; (L) GH post/inf gr 3-4 x 5 mins                 STM  (L) pectorals x 3 mins                                   THERAPEUTIC ACTIVITY                                    HEP: Refer to patient instructions.    Charges: Therapeutic exercise x 3; Neuromuscular re-education x 1      Total Timed Treatment: 48 min  Total Treatment Time: 48 min   N/A

## 2024-08-01 NOTE — OB PROVIDER H&P - ASSESSMENT
33 year old  at 39.0 weeks PROM since 830am, +GBS    -Admit to L and D  -Routine labs  -NPO/IVF  -Bictra  -EFM/toco  -Anesthesia consult  -Buccal cytotec for IOL agent  -AMP for GBS prophylaxis  -Anticipate     d/w MD Rafy Sorto FNP-BC

## 2024-08-01 NOTE — OB PROVIDER H&P - PROBLEM SELECTOR PLAN 1
33 year old  at 39.0 weeks PROM since 830am, +GBS    -Admit to L and D  -Routine labs  -NPO/IVF  -Bictra  -EFM/toco  -Anesthesia consult  -Buccal cytotec for IOL agent  -AMP for GBS prophylaxis  -Anticipate

## 2024-08-02 ENCOUNTER — TRANSCRIPTION ENCOUNTER (OUTPATIENT)
Age: 34
End: 2024-08-02

## 2024-08-02 LAB — T PALLIDUM AB TITR SER: NEGATIVE — SIGNIFICANT CHANGE UP

## 2024-08-02 RX ORDER — IBUPROFEN 200 MG
600 TABLET ORAL EVERY 6 HOURS
Refills: 0 | Status: DISCONTINUED | OUTPATIENT
Start: 2024-08-02 | End: 2024-08-04

## 2024-08-02 RX ORDER — SIMETHICONE 125 MG/1
80 TABLET, CHEWABLE ORAL EVERY 4 HOURS
Refills: 0 | Status: DISCONTINUED | OUTPATIENT
Start: 2024-08-02 | End: 2024-08-04

## 2024-08-02 RX ORDER — BACTERIOSTATIC SODIUM CHLORIDE 0.9 %
3 VIAL (ML) INJECTION EVERY 8 HOURS
Refills: 0 | Status: DISCONTINUED | OUTPATIENT
Start: 2024-08-02 | End: 2024-08-04

## 2024-08-02 RX ORDER — TRANEXAMIC ACID 650 MG/1
1000 TABLET ORAL ONCE
Refills: 0 | Status: COMPLETED | OUTPATIENT
Start: 2024-08-02 | End: 2024-08-02

## 2024-08-02 RX ORDER — HYDROCORTISONE 1 %
1 CREAM (GRAM) TOPICAL EVERY 6 HOURS
Refills: 0 | Status: DISCONTINUED | OUTPATIENT
Start: 2024-08-02 | End: 2024-08-04

## 2024-08-02 RX ORDER — METHYLERGONOVINE MALEATE 0.2 MG
0.2 TABLET ORAL ONCE
Refills: 0 | Status: COMPLETED | OUTPATIENT
Start: 2024-08-02 | End: 2024-08-02

## 2024-08-02 RX ORDER — CLOSTRIDIUM TETANI TOXOID ANTIGEN (FORMALDEHYDE INACTIVATED), CORYNEBACTERIUM DIPHTHERIAE TOXOID ANTIGEN (FORMALDEHYDE INACTIVATED), BORDETELLA PERTUSSIS TOXOID ANTIGEN (GLUTARALDEHYDE INACTIVATED), BORDETELLA PERTUSSIS FILAMENTOUS HEMAGGLUTININ ANTIGEN (FORMALDEHYDE INACTIVATED), BORDETELLA PERTUSSIS PERTACTIN ANTIGEN, AND BORDETELLA PERTUSSIS FIMBRIAE 2/3 ANTIGEN 5; 2; 2.5; 5; 3; 5 [LF]/.5ML; [LF]/.5ML; UG/.5ML; UG/.5ML; UG/.5ML; UG/.5ML
0.5 INJECTION, SUSPENSION INTRAMUSCULAR ONCE
Refills: 0 | Status: DISCONTINUED | OUTPATIENT
Start: 2024-08-02 | End: 2024-08-04

## 2024-08-02 RX ORDER — OXYTOCIN/RINGER'S LACTATE 20/1000 ML
41.67 PLASTIC BAG, INJECTION (ML) INTRAVENOUS
Qty: 20 | Refills: 0 | Status: DISCONTINUED | OUTPATIENT
Start: 2024-08-02 | End: 2024-08-04

## 2024-08-02 RX ORDER — DIBUCAINE 1 %
1 OINTMENT (GRAM) TOPICAL EVERY 6 HOURS
Refills: 0 | Status: DISCONTINUED | OUTPATIENT
Start: 2024-08-02 | End: 2024-08-04

## 2024-08-02 RX ORDER — ACETAMINOPHEN 500 MG
3 TABLET ORAL
Qty: 0 | Refills: 0 | DISCHARGE
Start: 2024-08-02

## 2024-08-02 RX ORDER — WITCH HAZEL 500 MG/1
1 CLOTH TOPICAL EVERY 4 HOURS
Refills: 0 | Status: DISCONTINUED | OUTPATIENT
Start: 2024-08-02 | End: 2024-08-04

## 2024-08-02 RX ORDER — LANOLIN 100 %
1 OINTMENT (GRAM) TOPICAL EVERY 6 HOURS
Refills: 0 | Status: DISCONTINUED | OUTPATIENT
Start: 2024-08-02 | End: 2024-08-04

## 2024-08-02 RX ORDER — CRANBERRY FRUIT EXTRACT 650 MG
0 CAPSULE ORAL
Refills: 0 | DISCHARGE

## 2024-08-02 RX ORDER — OXYCODONE HYDROCHLORIDE 30 MG/1
5 TABLET ORAL ONCE
Refills: 0 | Status: DISCONTINUED | OUTPATIENT
Start: 2024-08-02 | End: 2024-08-04

## 2024-08-02 RX ORDER — OXYCODONE HYDROCHLORIDE 30 MG/1
5 TABLET ORAL
Refills: 0 | Status: DISCONTINUED | OUTPATIENT
Start: 2024-08-02 | End: 2024-08-04

## 2024-08-02 RX ORDER — DIPHENHYDRAMINE HCL 25 MG
25 CAPSULE ORAL EVERY 6 HOURS
Refills: 0 | Status: DISCONTINUED | OUTPATIENT
Start: 2024-08-02 | End: 2024-08-04

## 2024-08-02 RX ORDER — OXYTOCIN/RINGER'S LACTATE 20/1000 ML
10 PLASTIC BAG, INJECTION (ML) INTRAVENOUS ONCE
Refills: 0 | Status: COMPLETED | OUTPATIENT
Start: 2024-08-02 | End: 2024-08-02

## 2024-08-02 RX ORDER — KETOROLAC TROMETHAMINE 10 MG
30 TABLET ORAL ONCE
Refills: 0 | Status: DISCONTINUED | OUTPATIENT
Start: 2024-08-02 | End: 2024-08-02

## 2024-08-02 RX ORDER — DEXTROSE MONOHYDRATE, SODIUM CHLORIDE, SODIUM LACTATE, CALCIUM CHLORIDE, MAGNESIUM CHLORIDE 1.5; 538; 448; 18.4; 5.08 G/100ML; MG/100ML; MG/100ML; MG/100ML; MG/100ML
1000 SOLUTION INTRAPERITONEAL ONCE
Refills: 0 | Status: COMPLETED | OUTPATIENT
Start: 2024-08-02 | End: 2024-08-02

## 2024-08-02 RX ORDER — MAGNESIUM HYDROXIDE 400 MG/5ML
30 SUSPENSION, ORAL (FINAL DOSE FORM) ORAL
Refills: 0 | Status: DISCONTINUED | OUTPATIENT
Start: 2024-08-02 | End: 2024-08-04

## 2024-08-02 RX ORDER — CRANBERRY FRUIT EXTRACT 650 MG
1 CAPSULE ORAL DAILY
Refills: 0 | Status: DISCONTINUED | OUTPATIENT
Start: 2024-08-02 | End: 2024-08-04

## 2024-08-02 RX ORDER — ACETAMINOPHEN 500 MG
975 TABLET ORAL
Refills: 0 | Status: DISCONTINUED | OUTPATIENT
Start: 2024-08-02 | End: 2024-08-04

## 2024-08-02 RX ORDER — IBUPROFEN 200 MG
600 TABLET ORAL EVERY 6 HOURS
Refills: 0 | Status: COMPLETED | OUTPATIENT
Start: 2024-08-02 | End: 2025-07-01

## 2024-08-02 RX ORDER — IBUPROFEN 200 MG
1 TABLET ORAL
Qty: 0 | Refills: 0 | DISCHARGE
Start: 2024-08-02

## 2024-08-02 RX ADMIN — Medication 0.2 MILLIGRAM(S): at 04:45

## 2024-08-02 RX ADMIN — Medication 1 TABLET(S): at 12:58

## 2024-08-02 RX ADMIN — Medication 975 MILLIGRAM(S): at 21:02

## 2024-08-02 RX ADMIN — TRANEXAMIC ACID 660 MILLIGRAM(S): 650 TABLET ORAL at 04:48

## 2024-08-02 RX ADMIN — Medication 600 MILLIGRAM(S): at 18:44

## 2024-08-02 RX ADMIN — DEXTROSE MONOHYDRATE, SODIUM CHLORIDE, SODIUM LACTATE, CALCIUM CHLORIDE, MAGNESIUM CHLORIDE 1000 MILLILITER(S): 1.5; 538; 448; 18.4; 5.08 SOLUTION INTRAPERITONEAL at 00:24

## 2024-08-02 RX ADMIN — AMPICILLIN 108 GRAM(S): 1 INJECTION, POWDER, FOR SOLUTION INTRAMUSCULAR; INTRAVENOUS at 01:14

## 2024-08-02 RX ADMIN — Medication 10 UNIT(S): at 04:38

## 2024-08-02 RX ADMIN — Medication 600 MILLIGRAM(S): at 19:14

## 2024-08-02 RX ADMIN — Medication 975 MILLIGRAM(S): at 22:02

## 2024-08-02 RX ADMIN — Medication 975 MILLIGRAM(S): at 12:57

## 2024-08-02 RX ADMIN — Medication 30 MILLIGRAM(S): at 09:23

## 2024-08-02 RX ADMIN — Medication 975 MILLIGRAM(S): at 13:27

## 2024-08-02 RX ADMIN — Medication 30 MILLIGRAM(S): at 09:53

## 2024-08-02 RX ADMIN — Medication 4 MILLIUNIT(S)/MIN: at 00:24

## 2024-08-02 NOTE — DISCHARGE NOTE OB - CARE PLAN
Principal Discharge DX:	 (normal spontaneous vaginal delivery)  Assessment and plan of treatment:	Return to baseline health, regular diet, follow up with dr ron   1

## 2024-08-02 NOTE — DISCHARGE NOTE OB - NS MD DC FALL RISK RISK
For information on Fall & Injury Prevention, visit: [email or web address] OR  [email or web address] OR  https://www.cdc.gov/steadi/patient.html

## 2024-08-02 NOTE — OB RN DELIVERY SUMMARY - NSSELHIDDEN_OBGYN_ALL_OB_FT
[NS_DeliveryAttending1_OBGYN_ALL_OB_FT:[email or web address]=],[NS_DeliveryRN_OBGYN_ALL_OB_FT:[id number]=]

## 2024-08-02 NOTE — OB RN DELIVERY SUMMARY - NS_SEPSISRSKCALC_OBGYN_ALL_OB_FT
EOS calculated successfully. EOS Risk Factor: 0.5/1000 live births ([facility] national incidence); GA=39w1d; Temp=98.96; ROM=20.1; GBS='Positive'; Antibiotics='Broad spectrum antibiotics 2-3.9 hrs prior to birth'

## 2024-08-02 NOTE — DISCHARGE NOTE OB - PATIENT PORTAL LINK FT
You can access the FollowMyHealth Patient Portal offered by [facility] by registering at the following website: [email or web address]. By joining [employer]’s FollowMyHealth portal, you will also be able to view your health information using other applications (apps) compatible with our system.

## 2024-08-02 NOTE — OB PROVIDER DELIVERY SUMMARY - NSPROVIDERDELIVERYNOTE_OBGYN_ALL_OB_FT
VAVD, NTSV, at 39-1, of a viable male infant apgar 9/9 weight 6-14, vacuum done from OP+2 over two contractions secondary to fetal bradycardia, placenta complete and uterus explored. RML, excised. ebl 500 cc. methergine, IM pitocin, and rectal cytotec given for initial uterine atony

## 2024-08-02 NOTE — DISCHARGE NOTE OB - CARE PROVIDER_API CALL
Norma Hillman  Obstetrics and Gynecology  [address], [address]  [place] [address]  Phone: [phone]  Fax: [phone]  Follow Up Time:

## 2024-08-02 NOTE — OB NEONATOLOGY/PEDIATRICIAN DELIVERY SUMMARY - NSPEDSNEONOTESA_OBGYN_ALL_OB_FT
Peds NP in attendance at delivery as requested for Cat II Tracing and vacuum. 39 wk male born via VAVD on  at 0436 to a 32 y/o  blood type A+ mother. No significant maternal or prenatal history. PNL as follows: HIV -, Hep B -, Hep C -, RPR NR, Rubella I, GBS + in urine, treated with Amp x 4 doses. SROM at 0830 on  with clear fluid. Baby emerged vigorous, crying, was warmed, dried, suctioned and stimulated with APGARS of 9/9. Mom plans to initiate breastfeeding & formula feedings. Declines Hep B vaccine and declines circ.   Highest maternal temp 37. EOS 0.12.

## 2024-08-03 LAB
HCT VFR BLD CALC: 28.6 % — LOW (ref 34.5–45)
HGB BLD-MCNC: 9.4 G/DL — LOW (ref 11.5–15.5)

## 2024-08-03 RX ORDER — MEASELS, MUMPS, AND RUBELLA VACCINE, LIVE 3.4-4.2
0.5 KIT SUBCUTANEOUS ONCE
Refills: 0 | Status: COMPLETED | OUTPATIENT
Start: 2024-08-03 | End: 2024-08-04

## 2024-08-03 RX ADMIN — Medication 600 MILLIGRAM(S): at 12:51

## 2024-08-03 RX ADMIN — Medication 975 MILLIGRAM(S): at 22:09

## 2024-08-03 RX ADMIN — Medication 975 MILLIGRAM(S): at 15:39

## 2024-08-03 RX ADMIN — Medication 600 MILLIGRAM(S): at 13:21

## 2024-08-03 RX ADMIN — Medication 975 MILLIGRAM(S): at 09:17

## 2024-08-03 RX ADMIN — Medication 975 MILLIGRAM(S): at 02:58

## 2024-08-03 RX ADMIN — Medication 600 MILLIGRAM(S): at 05:52

## 2024-08-03 RX ADMIN — Medication 600 MILLIGRAM(S): at 01:16

## 2024-08-03 RX ADMIN — Medication 975 MILLIGRAM(S): at 09:47

## 2024-08-03 RX ADMIN — Medication 1 TABLET(S): at 12:51

## 2024-08-03 RX ADMIN — Medication 975 MILLIGRAM(S): at 03:58

## 2024-08-03 RX ADMIN — Medication 975 MILLIGRAM(S): at 16:09

## 2024-08-03 RX ADMIN — Medication 600 MILLIGRAM(S): at 00:16

## 2024-08-03 RX ADMIN — Medication 975 MILLIGRAM(S): at 21:09

## 2024-08-03 NOTE — PROGRESS NOTE ADULT - SUBJECTIVE AND OBJECTIVE BOX
Postpartum Note- PPD#1    Allergies: No Known Allergies    Blood type: A positive  Rubella: Immune  RPR: Negative       S: Patient is a 33y  PPD#1 s/p .    Patient w/o complaints, pain is controlled.    Pt is OOB, tolerating PO, voiding and passing flatus. Lochia WNL.     O:  Vital Signs Last 24 Hrs  T(C): 36.3 (03 Aug 2024 06:48), Max: 37.2 (02 Aug 2024 09:50)  T(F): 97.4 (03 Aug 2024 06:48), Max: 99 (02 Aug 2024 09:50)  HR: 92 (03 Aug 2024 06:48) (61 - 94)  BP: 110/70 (03 Aug 2024 06:48) (109/70 - 122/75)  BP(mean): --  RR: 18 (03 Aug 2024 06:48) (18 - 18)  SpO2: 97% (03 Aug 2024 06:48) (96% - 100%)    Parameters below as of 03 Aug 2024 06:48  Patient On (Oxygen Delivery Method): room air     Gen: NAD  Abdomen: Soft, nontender, non-distended, fundus firm.  Lochia WNL  Ext: Neg edema, Neg calf tenderness    LABS:  Hemoglobin: 11.3 g/dL (24 @ 12:52)  Hematocrit: 36.2 % (24 @ 12:52)      A/P:  33y PPD#1 s/p , doing well.     PMHx: Denies  Current Issues: None    Increase OOB  Regular diet  F/u AM H/H  PO Pain protocol  Continue routine pp care    Lisa Najera PA-C      
ANXIETY/PANIC/FEAR
ANXIETY/PANIC/FEAR

## 2024-08-04 VITALS
HEART RATE: 85 BPM | RESPIRATION RATE: 17 BRPM | TEMPERATURE: 98 F | OXYGEN SATURATION: 97 % | SYSTOLIC BLOOD PRESSURE: 115 MMHG | DIASTOLIC BLOOD PRESSURE: 71 MMHG

## 2024-08-04 PROCEDURE — 86780 TREPONEMA PALLIDUM: CPT

## 2024-08-04 PROCEDURE — 36415 COLL VENOUS BLD VENIPUNCTURE: CPT

## 2024-08-04 PROCEDURE — 85014 HEMATOCRIT: CPT

## 2024-08-04 PROCEDURE — 90707 MMR VACCINE SC: CPT

## 2024-08-04 PROCEDURE — 85025 COMPLETE CBC W/AUTO DIFF WBC: CPT

## 2024-08-04 PROCEDURE — 86901 BLOOD TYPING SEROLOGIC RH(D): CPT

## 2024-08-04 PROCEDURE — 59050 FETAL MONITOR W/REPORT: CPT

## 2024-08-04 PROCEDURE — 86900 BLOOD TYPING SEROLOGIC ABO: CPT

## 2024-08-04 PROCEDURE — 86850 RBC ANTIBODY SCREEN: CPT

## 2024-08-04 PROCEDURE — 85018 HEMOGLOBIN: CPT

## 2024-08-04 RX ADMIN — Medication 600 MILLIGRAM(S): at 06:41

## 2024-08-04 RX ADMIN — Medication 975 MILLIGRAM(S): at 08:59

## 2024-08-04 RX ADMIN — Medication 600 MILLIGRAM(S): at 01:12

## 2024-08-04 RX ADMIN — MEASELS, MUMPS, AND RUBELLA VACCINE, LIVE 0.5 MILLILITER(S): KIT at 10:21

## 2024-08-04 RX ADMIN — Medication 600 MILLIGRAM(S): at 05:41

## 2024-08-04 RX ADMIN — Medication 600 MILLIGRAM(S): at 00:12

## 2024-08-04 RX ADMIN — Medication 975 MILLIGRAM(S): at 08:29

## 2024-08-14 ENCOUNTER — APPOINTMENT (OUTPATIENT)
Dept: ANTEPARTUM | Facility: CLINIC | Age: 34
End: 2024-08-14

## 2024-08-28 NOTE — OB RN PATIENT PROFILE - THE IMPORTANCE OF THE CORRECT PLACEMENT OF THE INFANT AND THE PARENT’S ABILITY TO ASSESS THE INFANT'S SKIN COLOR WHILE PLACED ON SKIN TO SKIN HAS BEEN REINFORCED.
Faxed LDCT results to [facility] [phone].    
Statement Selected

## 2024-10-08 ENCOUNTER — APPOINTMENT (OUTPATIENT)
Dept: ORTHOPEDIC SURGERY | Facility: CLINIC | Age: 34
End: 2024-10-08

## 2025-01-13 NOTE — OB RN DELIVERY SUMMARY - NS_LAB[facility]_OBGYN_ALL_OB_FT
Noted   
Patient state modafinil is working good- just a little sleep disturbance, please call patient if you have any further questions.    Thank you  
[id number]

## 2025-02-27 ENCOUNTER — APPOINTMENT (OUTPATIENT)
Dept: DERMATOLOGY | Facility: CLINIC | Age: 35
End: 2025-02-27
Payer: COMMERCIAL

## 2025-02-27 DIAGNOSIS — L85.3 XEROSIS CUTIS: ICD-10-CM

## 2025-02-27 DIAGNOSIS — L91.8 OTHER HYPERTROPHIC DISORDERS OF THE SKIN: ICD-10-CM

## 2025-02-27 DIAGNOSIS — L21.9 SEBORRHEIC DERMATITIS, UNSPECIFIED: ICD-10-CM

## 2025-02-27 DIAGNOSIS — L20.9 ATOPIC DERMATITIS, UNSPECIFIED: ICD-10-CM

## 2025-02-27 PROCEDURE — 99204 OFFICE O/P NEW MOD 45 MIN: CPT

## 2025-02-27 RX ORDER — KETOCONAZOLE 20 MG/ML
2 SUSPENSION TOPICAL
Qty: 1 | Refills: 2 | Status: ACTIVE | COMMUNITY
Start: 2025-02-27 | End: 1900-01-01

## 2025-02-27 RX ORDER — MOMETASONE FUROATE 1 MG/ML
0.1 SOLUTION TOPICAL
Qty: 1 | Refills: 1 | Status: ACTIVE | COMMUNITY
Start: 2025-02-27 | End: 1900-01-01

## 2025-03-03 RX ORDER — HALOBETASOL PROPIONATE 0.5 MG/G
0.05 OINTMENT TOPICAL
Qty: 1 | Refills: 3 | Status: ACTIVE | COMMUNITY
Start: 2025-02-27

## 2025-06-27 ENCOUNTER — APPOINTMENT (OUTPATIENT)
Dept: ULTRASOUND IMAGING | Facility: CLINIC | Age: 35
End: 2025-06-27
Payer: COMMERCIAL

## 2025-06-27 PROCEDURE — 76817 TRANSVAGINAL US OBSTETRIC: CPT

## 2025-07-29 ENCOUNTER — APPOINTMENT (OUTPATIENT)
Dept: ANTEPARTUM | Facility: CLINIC | Age: 35
End: 2025-07-29
Payer: COMMERCIAL

## 2025-07-29 ENCOUNTER — ASOB RESULT (OUTPATIENT)
Age: 35
End: 2025-07-29

## 2025-07-29 PROCEDURE — 76813 OB US NUCHAL MEAS 1 GEST: CPT

## 2025-07-29 PROCEDURE — 76801 OB US < 14 WKS SINGLE FETUS: CPT
